# Patient Record
Sex: FEMALE | Race: BLACK OR AFRICAN AMERICAN | NOT HISPANIC OR LATINO | Employment: PART TIME | URBAN - METROPOLITAN AREA
[De-identification: names, ages, dates, MRNs, and addresses within clinical notes are randomized per-mention and may not be internally consistent; named-entity substitution may affect disease eponyms.]

---

## 2023-03-20 LAB
ABO GROUP (TYPE) IN BLOOD: NORMAL
ANTIBODY SCREEN: NORMAL
ERYTHROCYTE DISTRIBUTION WIDTH (RATIO) BY AUTOMATED COUNT: 11.7 % (ref 11.5–14.5)
ERYTHROCYTE MEAN CORPUSCULAR HEMOGLOBIN CONCENTRATION (G/DL) BY AUTOMATED: 33 G/DL (ref 32–36)
ERYTHROCYTE MEAN CORPUSCULAR VOLUME (FL) BY AUTOMATED COUNT: 93 FL (ref 80–100)
ERYTHROCYTES (10*6/UL) IN BLOOD BY AUTOMATED COUNT: 3.8 X10E12/L (ref 4–5.2)
HEMATOCRIT (%) IN BLOOD BY AUTOMATED COUNT: 35.5 % (ref 36–46)
HEMOGLOBIN (G/DL) IN BLOOD: 11.7 G/DL (ref 12–16)
HEPATITIS B VIRUS SURFACE AG PRESENCE IN SERUM: NONREACTIVE
HEPATITIS C VIRUS AB PRESENCE IN SERUM: NONREACTIVE
HIV 1/ 2 AG/AB SCREEN: NONREACTIVE
LEUKOCYTES (10*3/UL) IN BLOOD BY AUTOMATED COUNT: 3.4 X10E9/L (ref 4.4–11.3)
NRBC (PER 100 WBCS) BY AUTOMATED COUNT: 0 /100 WBC (ref 0–0)
PLATELETS (10*3/UL) IN BLOOD AUTOMATED COUNT: 157 X10E9/L (ref 150–450)
REFLEX ADDED, ANEMIA PANEL: ABNORMAL
RH FACTOR: NORMAL
RUBELLA VIRUS IGG AB: POSITIVE
SYPHILIS TOTAL AB: NONREACTIVE
VARICELLA ZOSTER IGG: POSITIVE

## 2023-03-21 LAB
CHLAMYDIA TRACH., AMPLIFIED: NEGATIVE
N. GONORRHEA, AMPLIFIED: NEGATIVE
URINE CULTURE: NORMAL

## 2023-03-23 LAB
HEMOGLOBIN A2: 2.9 %
HEMOGLOBIN A: 96.7 %
HEMOGLOBIN F: 0.4 %
HEMOGLOBIN IDENTIFICATION INTERPRETATION: NORMAL
PATH REVIEW-HGB IDENTIFICATION: NORMAL

## 2023-08-01 ENCOUNTER — OFFICE VISIT (OUTPATIENT)
Dept: PRIMARY CARE | Facility: CLINIC | Age: 23
End: 2023-08-01
Payer: MEDICAID

## 2023-08-01 ENCOUNTER — LAB (OUTPATIENT)
Dept: LAB | Facility: LAB | Age: 23
End: 2023-08-01
Payer: MEDICAID

## 2023-08-01 VITALS
SYSTOLIC BLOOD PRESSURE: 110 MMHG | HEART RATE: 83 BPM | BODY MASS INDEX: 17.66 KG/M2 | WEIGHT: 96 LBS | HEIGHT: 62 IN | OXYGEN SATURATION: 98 % | DIASTOLIC BLOOD PRESSURE: 80 MMHG

## 2023-08-01 DIAGNOSIS — R62.7 POOR WEIGHT GAIN IN ADULT: Primary | ICD-10-CM

## 2023-08-01 DIAGNOSIS — R62.7 POOR WEIGHT GAIN IN ADULT: ICD-10-CM

## 2023-08-01 DIAGNOSIS — N30.01 ACUTE CYSTITIS WITH HEMATURIA: ICD-10-CM

## 2023-08-01 DIAGNOSIS — R35.0 URINARY FREQUENCY: ICD-10-CM

## 2023-08-01 DIAGNOSIS — R11.0 NAUSEA: ICD-10-CM

## 2023-08-01 LAB
ANION GAP IN SER/PLAS: 14 MMOL/L (ref 10–20)
APPEARANCE, URINE: ABNORMAL
BACTERIA, URINE: ABNORMAL /HPF
BILIRUBIN, URINE: NEGATIVE
BLOOD, URINE: ABNORMAL
CALCIUM (MG/DL) IN SER/PLAS: 9.5 MG/DL (ref 8.6–10.6)
CARBON DIOXIDE, TOTAL (MMOL/L) IN SER/PLAS: 24 MMOL/L (ref 21–32)
CHLORIDE (MMOL/L) IN SER/PLAS: 102 MMOL/L (ref 98–107)
COLOR, URINE: ABNORMAL
CREATININE (MG/DL) IN SER/PLAS: 0.87 MG/DL (ref 0.5–1.05)
ERYTHROCYTE DISTRIBUTION WIDTH (RATIO) BY AUTOMATED COUNT: 11.5 % (ref 11.5–14.5)
ERYTHROCYTE MEAN CORPUSCULAR HEMOGLOBIN CONCENTRATION (G/DL) BY AUTOMATED: 32.7 G/DL (ref 32–36)
ERYTHROCYTE MEAN CORPUSCULAR VOLUME (FL) BY AUTOMATED COUNT: 93 FL (ref 80–100)
ERYTHROCYTES (10*6/UL) IN BLOOD BY AUTOMATED COUNT: 3.9 X10E12/L (ref 4–5.2)
GFR FEMALE: >90 ML/MIN/1.73M2
GLUCOSE (MG/DL) IN SER/PLAS: 83 MG/DL (ref 74–99)
GLUCOSE, URINE: NEGATIVE MG/DL
HEMATOCRIT (%) IN BLOOD BY AUTOMATED COUNT: 36.4 % (ref 36–46)
HEMOGLOBIN (G/DL) IN BLOOD: 11.9 G/DL (ref 12–16)
KETONES, URINE: ABNORMAL MG/DL
LEUKOCYTE ESTERASE, URINE: ABNORMAL
LEUKOCYTES (10*3/UL) IN BLOOD BY AUTOMATED COUNT: 6.2 X10E9/L (ref 4.4–11.3)
MUCUS, URINE: ABNORMAL /LPF
NITRITE, URINE: POSITIVE
NRBC (PER 100 WBCS) BY AUTOMATED COUNT: 0 /100 WBC (ref 0–0)
PH, URINE: 6 (ref 5–8)
PLATELETS (10*3/UL) IN BLOOD AUTOMATED COUNT: 164 X10E9/L (ref 150–450)
POTASSIUM (MMOL/L) IN SER/PLAS: 3.7 MMOL/L (ref 3.5–5.3)
PROTEIN, URINE: ABNORMAL MG/DL
RBC, URINE: 51 /HPF (ref 0–5)
SEDIMENTATION RATE, ERYTHROCYTE: 4 MM/H (ref 0–20)
SODIUM (MMOL/L) IN SER/PLAS: 136 MMOL/L (ref 136–145)
SPECIFIC GRAVITY, URINE: 1.02 (ref 1–1.03)
SQUAMOUS EPITHELIAL CELLS, URINE: <1 /HPF
THYROTROPIN (MIU/L) IN SER/PLAS BY DETECTION LIMIT <= 0.05 MIU/L: 0.93 MIU/L (ref 0.44–3.98)
UREA NITROGEN (MG/DL) IN SER/PLAS: 10 MG/DL (ref 6–23)
UROBILINOGEN, URINE: 4 MG/DL (ref 0–1.9)
WBC, URINE: >182 /HPF (ref 0–5)

## 2023-08-01 PROCEDURE — 1036F TOBACCO NON-USER: CPT | Performed by: STUDENT IN AN ORGANIZED HEALTH CARE EDUCATION/TRAINING PROGRAM

## 2023-08-01 PROCEDURE — 85652 RBC SED RATE AUTOMATED: CPT

## 2023-08-01 PROCEDURE — 87491 CHLMYD TRACH DNA AMP PROBE: CPT

## 2023-08-01 PROCEDURE — 99203 OFFICE O/P NEW LOW 30 MIN: CPT | Performed by: STUDENT IN AN ORGANIZED HEALTH CARE EDUCATION/TRAINING PROGRAM

## 2023-08-01 PROCEDURE — 81001 URINALYSIS AUTO W/SCOPE: CPT

## 2023-08-01 PROCEDURE — 87661 TRICHOMONAS VAGINALIS AMPLIF: CPT

## 2023-08-01 PROCEDURE — 80048 BASIC METABOLIC PNL TOTAL CA: CPT

## 2023-08-01 PROCEDURE — 81025 URINE PREGNANCY TEST: CPT

## 2023-08-01 PROCEDURE — 36415 COLL VENOUS BLD VENIPUNCTURE: CPT

## 2023-08-01 PROCEDURE — 84443 ASSAY THYROID STIM HORMONE: CPT

## 2023-08-01 PROCEDURE — 85027 COMPLETE CBC AUTOMATED: CPT

## 2023-08-01 PROCEDURE — 87591 N.GONORRHOEAE DNA AMP PROB: CPT

## 2023-08-01 RX ORDER — AMOXICILLIN AND CLAVULANATE POTASSIUM 875; 125 MG/1; MG/1
875 TABLET, FILM COATED ORAL 2 TIMES DAILY
Qty: 10 TABLET | Refills: 0 | Status: CANCELLED | OUTPATIENT
Start: 2023-08-01 | End: 2023-08-06

## 2023-08-01 NOTE — PROGRESS NOTES
"Subjective   Patient ID: Misael Diaz is a 23 y.o. female who presents for Establish Care, UTI, and Weight Loss.  HPI  Concerns today:  #Headaches   -For a month   -Pounding, generalized  -Every few days, most recent one was yesterday  -Sleep helps  -No photophobia, phonophobia    #Worried she is pregnant   -Nausea off and on  -Very tired  -Breast and back pain  -Month ago had period for one day of spotting  -2 negative pregnancy tests 7/7   -Miscarried in March 2023  -Not on birth control  -One partner     #Concern for UTI  -Increased urgency, bad odor, no blood, no fevers or chills  -Also has increased amount of white creamy discharge     #Concern for weight loss?  -Trying to drink boost supplements  -2021 44.6 kg  -Today 43.5 kg     Chronic issues:  #Eczema    Social history:  -Lives at home with mom  -Does not drink, smoke    Family history:   -Mother has blood clot and HTN      No allergies   No medications    Objective   Visit Vitals  /80   Pulse 83   Ht 1.575 m (5' 2\")   Wt (!) 43.5 kg (96 lb)   SpO2 98%   BMI 17.56 kg/m²   Smoking Status Never   BSA 1.38 m²      Physical Exam  General: Well appearing, conversational, in no acute distress  HEENT: EOMI, PERRL, nares patent without congestion, MMM  CV: RRR, no murmurs  Resp: Lungs CTAB, normal work of breathing  GI: Soft, nondistended, mild suprapubic tenderness to palpation, BS+  Ext: No lower ext swelling  Skin: Warm, dry, no rashes  Neuro: Awake, alert, oriented x3, moving all 4 extremities, nonfocal, normal gait, ambulates without assistance  Psych: Appropriate mood and affect      Assessment/Plan   Misael Diaz is a 23 y.o. female who presents for Establish Care, UTI, and Weight Loss. Concerned that patient may be pregnant based on symptoms and having only one day of spotting last month which may have been implantation bleeding. Will get UA and STI screening for urinary symptoms and a pregnancy test. Patient also having issues gaining " weight. Discussed sending a couple labs for this as well including TSH and inflammatory markers. Advised prenatal vitamin and tylenol for headache in the meantime.      Problem List Items Addressed This Visit    None  Visit Diagnoses       Poor weight gain in adult    -  Primary    Relevant Orders    CBC (Completed)    Basic Metabolic Panel (Completed)    TSH with reflex to Free T4 if abnormal (Completed)    Sedimentation Rate (Completed)    Urinary frequency        Relevant Orders    C. Trachomatis / N. Gonorrhoeae, Amplified Detection (Completed)    TRICH VAGINALIS, AMPLIFIED (Completed)    Urinalysis with Reflex Microscopic (Completed)    POCT Pregnancy, Urine manually resulted    Nausea        Relevant Orders    C. Trachomatis / N. Gonorrhoeae, Amplified Detection (Completed)    TRICH VAGINALIS, AMPLIFIED (Completed)    Urinalysis with Reflex Microscopic (Completed)    POCT Pregnancy, Urine manually resulted    CBC (Completed)    Basic Metabolic Panel (Completed)    TSH with reflex to Free T4 if abnormal (Completed)    Sedimentation Rate (Completed)    Acute cystitis with hematuria                     Kelsey Haro MD MPH

## 2023-08-01 NOTE — PATIENT INSTRUCTIONS
Thank you for coming today [facility].    Please go down to floor 1 and turn right to go to lab in [address]. If they are closed, go to floor [address] in [address].     I will be in touch with results!

## 2023-08-02 DIAGNOSIS — N30.01 ACUTE CYSTITIS WITH HEMATURIA: Primary | ICD-10-CM

## 2023-08-02 DIAGNOSIS — Z34.90 PREGNANCY, UNSPECIFIED GESTATIONAL AGE (HHS-HCC): ICD-10-CM

## 2023-08-02 LAB
CHLAMYDIA TRACH., AMPLIFIED: NEGATIVE
HCG, URINE: POSITIVE
N. GONORRHEA, AMPLIFIED: NEGATIVE
TRICHOMONAS VAGINALIS: NEGATIVE

## 2023-08-02 RX ORDER — AMOXICILLIN AND CLAVULANATE POTASSIUM 875; 125 MG/1; MG/1
875 TABLET, FILM COATED ORAL 2 TIMES DAILY
Qty: 10 TABLET | Refills: 0 | Status: SHIPPED | OUTPATIENT
Start: 2023-08-02 | End: 2023-08-07

## 2023-08-13 PROBLEM — L30.9 ECZEMA: Status: ACTIVE | Noted: 2023-08-13

## 2023-08-15 LAB — URINE CULTURE: NO GROWTH

## 2023-08-30 LAB
HEPATITIS B VIRUS SURFACE AG PRESENCE IN SERUM: NONREACTIVE
HEPATITIS C VIRUS AB PRESENCE IN SERUM: NONREACTIVE
HIV 1/ 2 AG/AB SCREEN: NONREACTIVE
RUBELLA VIRUS IGG AB: POSITIVE
SYPHILIS TOTAL AB: NONREACTIVE

## 2023-08-31 LAB
ABO GROUP (TYPE) IN BLOOD: NORMAL
ANTIBODY SCREEN: NORMAL
HEMOGLOBIN A2: 2.7 %
HEMOGLOBIN A: 96.8 %
HEMOGLOBIN F: 0.5 %
HEMOGLOBIN IDENTIFICATION INTERPRETATION: NORMAL
PATH REVIEW-HGB IDENTIFICATION: NORMAL
RH FACTOR: NORMAL

## 2023-10-02 ENCOUNTER — ANCILLARY PROCEDURE (OUTPATIENT)
Dept: RADIOLOGY | Facility: CLINIC | Age: 23
End: 2023-10-02
Payer: MEDICAID

## 2023-10-02 DIAGNOSIS — Z36.87 ENCOUNTER FOR ANTENATAL SCREENING FOR UNCERTAIN DATES (HHS-HCC): ICD-10-CM

## 2023-10-02 PROCEDURE — 76813 OB US NUCHAL MEAS 1 GEST: CPT | Performed by: OBSTETRICS & GYNECOLOGY

## 2023-10-02 PROCEDURE — 76813 OB US NUCHAL MEAS 1 GEST: CPT

## 2023-10-06 PROBLEM — R62.51 SLOW WEIGHT GAIN, CHILD: Status: ACTIVE | Noted: 2023-10-06

## 2023-10-06 PROBLEM — O21.9 NAUSEA AND VOMITING IN PREGNANCY (HHS-HCC): Status: ACTIVE | Noted: 2023-10-06

## 2023-10-06 PROBLEM — N94.6 DYSMENORRHEA: Status: ACTIVE | Noted: 2023-10-06

## 2023-10-06 PROBLEM — K13.0 CHEILOSIS: Status: ACTIVE | Noted: 2023-10-06

## 2023-10-06 PROBLEM — Z34.90 PREGNANCY (HHS-HCC): Status: ACTIVE | Noted: 2023-10-06

## 2023-10-06 RX ORDER — LYSINE HCL 500 MG
1 TABLET ORAL DAILY
Status: ON HOLD | COMMUNITY
Start: 2022-05-24 | End: 2023-11-03

## 2023-10-06 RX ORDER — TRIAMCINOLONE ACETONIDE 1 MG/G
CREAM TOPICAL
Status: ON HOLD | COMMUNITY
Start: 2022-10-26 | End: 2023-12-08 | Stop reason: ALTCHOICE

## 2023-10-06 RX ORDER — PYRIDOXINE HCL (VITAMIN B6) 25 MG
25 TABLET ORAL 3 TIMES DAILY
COMMUNITY
Start: 2023-09-21 | End: 2024-01-04

## 2023-10-06 RX ORDER — DOCUSATE SODIUM 100 MG/1
100 CAPSULE, LIQUID FILLED ORAL 2 TIMES DAILY PRN
COMMUNITY
Start: 2023-08-17 | End: 2024-01-04 | Stop reason: ALTCHOICE

## 2023-10-06 RX ORDER — FOLIC ACID/MULTIVIT,IRON,MINER 0.4MG-18MG
1 TABLET ORAL DAILY
COMMUNITY
Start: 2023-02-11 | End: 2023-12-08 | Stop reason: HOSPADM

## 2023-10-06 RX ORDER — METOCLOPRAMIDE 5 MG/1
5 TABLET ORAL EVERY 8 HOURS
Status: ON HOLD | COMMUNITY
Start: 2023-09-21 | End: 2023-11-03

## 2023-10-09 ENCOUNTER — ROUTINE PRENATAL (OUTPATIENT)
Dept: OBSTETRICS AND GYNECOLOGY | Facility: CLINIC | Age: 23
End: 2023-10-09
Payer: MEDICAID

## 2023-10-09 VITALS — BODY MASS INDEX: 16.64 KG/M2 | DIASTOLIC BLOOD PRESSURE: 60 MMHG | WEIGHT: 91 LBS | SYSTOLIC BLOOD PRESSURE: 100 MMHG

## 2023-10-09 DIAGNOSIS — Z3A.14 14 WEEKS GESTATION OF PREGNANCY (HHS-HCC): Primary | ICD-10-CM

## 2023-10-09 PROCEDURE — 99213 OFFICE O/P EST LOW 20 MIN: CPT | Performed by: OBSTETRICS & GYNECOLOGY

## 2023-10-09 NOTE — PROGRESS NOTES
Pt feeling well. Having some constipation. Not taking PNV.   Recommend PNV daily. New Rx needed.   Start colace daily. Miralax PRN.  New OB labs wnl.   NT wnl.   Anatomy US scheduled.   All question answered.

## 2023-11-01 ENCOUNTER — ROUTINE PRENATAL (OUTPATIENT)
Dept: OBSTETRICS AND GYNECOLOGY | Facility: CLINIC | Age: 23
End: 2023-11-01
Payer: MEDICAID

## 2023-11-01 VITALS — DIASTOLIC BLOOD PRESSURE: 56 MMHG | BODY MASS INDEX: 17.38 KG/M2 | WEIGHT: 95 LBS | SYSTOLIC BLOOD PRESSURE: 94 MMHG

## 2023-11-01 DIAGNOSIS — Z33.1 NORMAL PREGNANCY, INCIDENTAL (HHS-HCC): ICD-10-CM

## 2023-11-01 DIAGNOSIS — Z3A.17 17 WEEKS GESTATION OF PREGNANCY (HHS-HCC): Primary | ICD-10-CM

## 2023-11-01 PROBLEM — R62.51 SLOW WEIGHT GAIN, CHILD: Status: RESOLVED | Noted: 2023-10-06 | Resolved: 2023-11-01

## 2023-11-01 PROBLEM — K13.0 CHEILOSIS: Status: RESOLVED | Noted: 2023-10-06 | Resolved: 2023-11-01

## 2023-11-01 PROBLEM — N94.6 DYSMENORRHEA: Status: RESOLVED | Noted: 2023-10-06 | Resolved: 2023-11-01

## 2023-11-01 PROBLEM — Z34.90 PREGNANCY (HHS-HCC): Status: RESOLVED | Noted: 2023-10-06 | Resolved: 2023-11-01

## 2023-11-01 PROCEDURE — 99213 OFFICE O/P EST LOW 20 MIN: CPT | Performed by: OBSTETRICS & GYNECOLOGY

## 2023-11-01 NOTE — PROGRESS NOTES
Pt reports she never got Rx for PNV. Will resend today.   Continues to have some nausea but eating at least 2 meals a day and lots of snacks.   Anatomy US scheduled.   Declines flu vaccine.

## 2023-11-03 ENCOUNTER — HOSPITAL ENCOUNTER (OUTPATIENT)
Facility: HOSPITAL | Age: 23
Discharge: HOME | End: 2023-11-03
Attending: STUDENT IN AN ORGANIZED HEALTH CARE EDUCATION/TRAINING PROGRAM | Admitting: STUDENT IN AN ORGANIZED HEALTH CARE EDUCATION/TRAINING PROGRAM
Payer: MEDICAID

## 2023-11-03 VITALS
RESPIRATION RATE: 16 BRPM | SYSTOLIC BLOOD PRESSURE: 106 MMHG | WEIGHT: 94.58 LBS | HEIGHT: 62 IN | BODY MASS INDEX: 17.4 KG/M2 | TEMPERATURE: 98.1 F | HEART RATE: 95 BPM | OXYGEN SATURATION: 100 % | DIASTOLIC BLOOD PRESSURE: 56 MMHG

## 2023-11-03 PROCEDURE — 2500000001 HC RX 250 WO HCPCS SELF ADMINISTERED DRUGS (ALT 637 FOR MEDICARE OP)

## 2023-11-03 PROCEDURE — 94760 N-INVAS EAR/PLS OXIMETRY 1: CPT

## 2023-11-03 RX ORDER — METOCLOPRAMIDE 10 MG/1
10 TABLET ORAL ONCE
Status: COMPLETED | OUTPATIENT
Start: 2023-11-03 | End: 2023-11-03

## 2023-11-03 RX ORDER — ACETAMINOPHEN 325 MG/1
975 TABLET ORAL ONCE
Status: COMPLETED | OUTPATIENT
Start: 2023-11-03 | End: 2023-11-03

## 2023-11-03 RX ADMIN — ACETAMINOPHEN 975 MG: 325 TABLET ORAL at 22:07

## 2023-11-03 RX ADMIN — METOCLOPRAMIDE 10 MG: 10 TABLET ORAL at 22:06

## 2023-11-03 SDOH — HEALTH STABILITY: MENTAL HEALTH: WERE YOU ABLE TO COMPLETE ALL THE BEHAVIORAL HEALTH SCREENINGS?: YES

## 2023-11-03 SDOH — HEALTH STABILITY: MENTAL HEALTH: NON-SPECIFIC ACTIVE SUICIDAL THOUGHTS (PAST 1 MONTH): NO

## 2023-11-03 SDOH — HEALTH STABILITY: MENTAL HEALTH: HAVE YOU USED ANY SUBSTANCES (CANABIS, COCAINE, HEROIN, HALLUCINOGENS, INHALANTS, ETC.) IN THE PAST 12 MONTHS?: NO

## 2023-11-03 SDOH — SOCIAL STABILITY: SOCIAL INSECURITY: DOES ANYONE TRY TO KEEP YOU FROM HAVING/CONTACTING OTHER FRIENDS OR DOING THINGS OUTSIDE YOUR HOME?: NO

## 2023-11-03 SDOH — SOCIAL STABILITY: SOCIAL INSECURITY: ARE YOU OR HAVE YOU BEEN THREATENED OR ABUSED PHYSICALLY, EMOTIONALLY, OR SEXUALLY BY ANYONE?: NO

## 2023-11-03 SDOH — SOCIAL STABILITY: SOCIAL INSECURITY: PHYSICAL ABUSE: DENIES

## 2023-11-03 SDOH — SOCIAL STABILITY: SOCIAL INSECURITY: ABUSE SCREEN: ADULT

## 2023-11-03 SDOH — ECONOMIC STABILITY: HOUSING INSECURITY: DO YOU FEEL UNSAFE GOING BACK TO THE PLACE WHERE YOU ARE LIVING?: NO

## 2023-11-03 SDOH — SOCIAL STABILITY: SOCIAL INSECURITY: HAS ANYONE EVER THREATENED TO HURT YOUR FAMILY OR YOUR PETS?: NO

## 2023-11-03 SDOH — SOCIAL STABILITY: SOCIAL INSECURITY: ARE THERE ANY APPARENT SIGNS OF INJURIES/BEHAVIORS THAT COULD BE RELATED TO ABUSE/NEGLECT?: NO

## 2023-11-03 SDOH — HEALTH STABILITY: MENTAL HEALTH: WISH TO BE DEAD (PAST 1 MONTH): NO

## 2023-11-03 SDOH — HEALTH STABILITY: MENTAL HEALTH: SUICIDAL BEHAVIOR (LIFETIME): NO

## 2023-11-03 SDOH — SOCIAL STABILITY: SOCIAL INSECURITY: HAVE YOU HAD THOUGHTS OF HARMING ANYONE ELSE?: NO

## 2023-11-03 SDOH — SOCIAL STABILITY: SOCIAL INSECURITY: VERBAL ABUSE: DENIES

## 2023-11-03 SDOH — SOCIAL STABILITY: SOCIAL INSECURITY: DO YOU FEEL ANYONE HAS EXPLOITED OR TAKEN ADVANTAGE OF YOU FINANCIALLY OR OF YOUR PERSONAL PROPERTY?: NO

## 2023-11-03 ASSESSMENT — PAIN SCALES - GENERAL
PAINLEVEL_OUTOF10: 2
PAINLEVEL_OUTOF10: 6
PAINLEVEL_OUTOF10: 8
PAINLEVEL_OUTOF10: 4

## 2023-11-03 ASSESSMENT — LIFESTYLE VARIABLES
HOW OFTEN DO YOU HAVE 6 OR MORE DRINKS ON ONE OCCASION: NEVER
SKIP TO QUESTIONS 9-10: 1
AUDIT-C TOTAL SCORE: 0
AUDIT-C TOTAL SCORE: 0
HOW OFTEN DO YOU HAVE A DRINK CONTAINING ALCOHOL: NEVER
HOW MANY STANDARD DRINKS CONTAINING ALCOHOL DO YOU HAVE ON A TYPICAL DAY: PATIENT DOES NOT DRINK

## 2023-11-03 ASSESSMENT — PATIENT HEALTH QUESTIONNAIRE - PHQ9: 1. LITTLE INTEREST OR PLEASURE IN DOING THINGS: NOT AT ALL

## 2023-11-04 ENCOUNTER — HOSPITAL ENCOUNTER (OUTPATIENT)
Facility: HOSPITAL | Age: 23
Discharge: HOME | End: 2023-11-04
Payer: MEDICAID

## 2023-11-04 PROCEDURE — 99213 OFFICE O/P EST LOW 20 MIN: CPT | Mod: GC

## 2023-11-04 PROCEDURE — 4500999001 HC ED NO CHARGE

## 2023-11-04 NOTE — H&P
"Obstetrical Triage History and Physical    Assessment   22yo  at 17.3wga presenting for headaches and abdominal pain.     Headache  - improved with Tylenol and Reglan  - no h/o hypertensive disease, normotensive today    Abdominal pain  - VSS, abd exam benign  - likely round ligament pain  - given Tylenol and heat packs    Discharged home. Return precautions given.    Seen and discussed w/ Dr. Gerda Dale MD PGY-1  Obstetrics & Gynecology      Subjective   History of Present Pregnancy  Misael Diaz is a 23 y.o.  gravid, female. Patient's last menstrual period was 2023. with an Estimated Date of Delivery of 2024, by Last Menstrual Period, who is now 17w4d gestation. The patient's blood type is A POS.     Patient reporting headaches since the beginning of the pregnancy. Has not taken any medication for relief. States they resolve upon sleeping. Last night was unable to sleep d/t headache as well as abdominal pain. Reports pain radiating from the fundus towards her lower pelvis. Worsens with position changes. Does endorse nausea. Denies any fevers, chills, diarrhea, constipation, or urinary symptoms.     Objective   Recent Vital Signs:                                 /56   Pulse 95   Temp 36.7 °C (98.1 °F) (Temporal)   Resp 16   Ht 1.575 m (5' 2\")   Wt (!) 42.9 kg (94 lb 9.2 oz)   BMI 17.30 kg/m²     BP & Temp Min/Max Last 24 Hours:     BP  Min: 103/62   Min taken time: 23  Max: 106/56   Max taken time: 23 2342  Temp  Av.7 °C (98.1 °F)  Min: 36.7 °C (98.1 °F)   Min taken time: 23  Max: 36.7 °C (98.1 °F)   Max taken time: 23    Physical Examination:  GENERAL: Examination reveals a well developed, well nourished, gravid female in no acute distress. She is alert and cooperative.  HEENT: External ears normal. Nose normal, no erythema or discharge. Mouth and throat clear.  NECK: supple, no significant adenopathy  LUNGS: Normal " respiratory effort  HEART: RRR  ABDOMEN: Gravid, mild tenderness to palpation of the uterus, no rebound or guarding  VAGINA: normal appearing vagina with normal color and discharge and no lesions noted, cervix is closed, long, and high  EXTREMITIES: no limitation in range of motion  SKIN: normal coloration and turgor, no rashes  NEUROLOGICAL: alert, oriented, normal speech, no focal findings or movement disorder noted  PSYCHOLOGICAL: awake and alert; oriented to person, place, and time      I saw and evaluated the patient. I personally obtained the key and critical portions of the history and physical exam or was physically present for key and critical portions performed by the resident/fellow. I reviewed the resident/fellow's documentation and discussed the patient with the resident/fellow. I agree with the resident/fellow's medical decision making as documented in the note.    Eleanor Lorenz MD

## 2023-11-20 ENCOUNTER — ANCILLARY PROCEDURE (OUTPATIENT)
Dept: RADIOLOGY | Facility: CLINIC | Age: 23
End: 2023-11-20
Payer: MEDICAID

## 2023-11-20 DIAGNOSIS — Z3A.19 19 WEEKS GESTATION OF PREGNANCY (HHS-HCC): Primary | ICD-10-CM

## 2023-11-20 DIAGNOSIS — Z36.3 ENCOUNTER FOR ANTENATAL SCREENING FOR MALFORMATIONS (HHS-HCC): ICD-10-CM

## 2023-11-20 PROCEDURE — 76811 OB US DETAILED SNGL FETUS: CPT | Performed by: OBSTETRICS & GYNECOLOGY

## 2023-11-20 PROCEDURE — 76811 OB US DETAILED SNGL FETUS: CPT

## 2023-12-05 ENCOUNTER — INITIAL PRENATAL (OUTPATIENT)
Dept: OBSTETRICS AND GYNECOLOGY | Facility: CLINIC | Age: 23
End: 2023-12-05
Payer: MEDICAID

## 2023-12-05 VITALS — DIASTOLIC BLOOD PRESSURE: 59 MMHG | BODY MASS INDEX: 18.29 KG/M2 | WEIGHT: 100 LBS | SYSTOLIC BLOOD PRESSURE: 99 MMHG

## 2023-12-05 DIAGNOSIS — Z3A.22 22 WEEKS GESTATION OF PREGNANCY (HHS-HCC): ICD-10-CM

## 2023-12-05 DIAGNOSIS — O26.892 PREGNANCY HEADACHE IN SECOND TRIMESTER (HHS-HCC): Primary | ICD-10-CM

## 2023-12-05 DIAGNOSIS — R51.9 PREGNANCY HEADACHE IN SECOND TRIMESTER (HHS-HCC): Primary | ICD-10-CM

## 2023-12-05 PROCEDURE — 99213 OFFICE O/P EST LOW 20 MIN: CPT | Performed by: OBSTETRICS & GYNECOLOGY

## 2023-12-05 RX ORDER — METOCLOPRAMIDE 10 MG/1
10 TABLET ORAL 4 TIMES DAILY PRN
Qty: 10 TABLET | Refills: 3 | Status: SHIPPED | OUTPATIENT
Start: 2023-12-05 | End: 2024-01-04 | Stop reason: ALTCHOICE

## 2023-12-05 RX ORDER — POLYETHYLENE GLYCOL 3350 17 G/17G
17 POWDER, FOR SOLUTION ORAL DAILY
COMMUNITY
End: 2024-01-04

## 2023-12-05 RX ORDER — DIPHENHYDRAMINE HCL 25 MG
50 CAPSULE ORAL EVERY 8 HOURS PRN
Qty: 30 CAPSULE | Refills: 2 | Status: SHIPPED | OUTPATIENT
Start: 2023-12-05 | End: 2024-03-06 | Stop reason: HOSPADM

## 2023-12-05 NOTE — PROGRESS NOTES
Pt reports she is not doing well. C/o frequent headaches and lots of stress.  Pt reports stress mostly surrounds her family and living situation. She is currently living with her mom who lost her job.  Patient is unsure how much longer her mom will be able to keep her home.  Pt states she is able to financially support herself and has looked for new housing but has not moved yet.  Wondering about support services to help with this.  Pt also reports new onset headaches.  Happening every few days.  Currently has headache that is 5 out of 10 on pain scale.  Has tried tylenol but reports it usually doesn't help. When pain gets to 7 out of 10 patient states she has to lie down and sleep and then headache will resolve. Denies history of headaches.  She reports she is drinking plenty of water.    Pt reports she sees a therapist who has been trying to help her with stress.     Anatomy US wnl.     Headaches  Discussed when to seek emergency care. Sent rx for benadryl and reglan to see if this would help with headaches. Recommend plenty of oral hydration.      Stress  Will help patient with resources as much as possible.   Provided positive support for patient - she has already taken steps to remove herself from stressors but feels she needs more support.   Discussed medication for anxiety/depression but patient declines. States she would never consider taking medication during her pregnancy even if I tell her it is safe in pregnancy.

## 2023-12-08 ENCOUNTER — HOSPITAL ENCOUNTER (OUTPATIENT)
Facility: HOSPITAL | Age: 23
Discharge: HOME | End: 2023-12-08
Attending: OBSTETRICS & GYNECOLOGY | Admitting: OBSTETRICS & GYNECOLOGY
Payer: MEDICAID

## 2023-12-08 ENCOUNTER — HOSPITAL ENCOUNTER (OUTPATIENT)
Facility: HOSPITAL | Age: 23
End: 2023-12-08
Attending: OBSTETRICS & GYNECOLOGY | Admitting: OBSTETRICS & GYNECOLOGY
Payer: MEDICAID

## 2023-12-08 VITALS
HEART RATE: 86 BPM | WEIGHT: 99.65 LBS | RESPIRATION RATE: 16 BRPM | TEMPERATURE: 97.9 F | SYSTOLIC BLOOD PRESSURE: 97 MMHG | BODY MASS INDEX: 18.34 KG/M2 | OXYGEN SATURATION: 97 % | HEIGHT: 62 IN | DIASTOLIC BLOOD PRESSURE: 54 MMHG

## 2023-12-08 DIAGNOSIS — N94.9 ROUND LIGAMENT PAIN: Primary | ICD-10-CM

## 2023-12-08 LAB
POC APPEARANCE, URINE: CLEAR
POC BILIRUBIN, URINE: NEGATIVE
POC BLOOD, URINE: NEGATIVE
POC COLOR, URINE: YELLOW
POC GLUCOSE, URINE: NEGATIVE MG/DL
POC KETONES, URINE: NEGATIVE MG/DL
POC LEUKOCYTES, URINE: NEGATIVE
POC NITRITE,URINE: NEGATIVE
POC PH, URINE: 6.5 PH
POC PROTEIN, URINE: NEGATIVE MG/DL
POC SPECIFIC GRAVITY, URINE: 1.02
POC UROBILINOGEN, URINE: 2 EU/DL

## 2023-12-08 PROCEDURE — 94760 N-INVAS EAR/PLS OXIMETRY 1: CPT

## 2023-12-08 PROCEDURE — 99214 OFFICE O/P EST MOD 30 MIN: CPT | Mod: 25

## 2023-12-08 PROCEDURE — 2500000001 HC RX 250 WO HCPCS SELF ADMINISTERED DRUGS (ALT 637 FOR MEDICARE OP)

## 2023-12-08 PROCEDURE — 99213 OFFICE O/P EST LOW 20 MIN: CPT

## 2023-12-08 PROCEDURE — 4500999001 HC ED NO CHARGE

## 2023-12-08 RX ORDER — CYCLOBENZAPRINE HCL 10 MG
10 TABLET ORAL ONCE
Status: COMPLETED | OUTPATIENT
Start: 2023-12-08 | End: 2023-12-08

## 2023-12-08 RX ORDER — ACETAMINOPHEN 325 MG/1
975 TABLET ORAL ONCE
Status: COMPLETED | OUTPATIENT
Start: 2023-12-08 | End: 2023-12-08

## 2023-12-08 RX ADMIN — ACETAMINOPHEN 975 MG: 325 TABLET ORAL at 11:04

## 2023-12-08 RX ADMIN — CYCLOBENZAPRINE 10 MG: 10 TABLET, FILM COATED ORAL at 11:04

## 2023-12-08 SDOH — HEALTH STABILITY: MENTAL HEALTH: WERE YOU ABLE TO COMPLETE ALL THE BEHAVIORAL HEALTH SCREENINGS?: YES

## 2023-12-08 SDOH — HEALTH STABILITY: MENTAL HEALTH: HAVE YOU USED ANY SUBSTANCES (CANABIS, COCAINE, HEROIN, HALLUCINOGENS, INHALANTS, ETC.) IN THE PAST 12 MONTHS?: NO

## 2023-12-08 SDOH — SOCIAL STABILITY: SOCIAL INSECURITY: ABUSE SCREEN: ADULT

## 2023-12-08 SDOH — HEALTH STABILITY: MENTAL HEALTH: SUICIDAL BEHAVIOR (LIFETIME): NO

## 2023-12-08 SDOH — SOCIAL STABILITY: SOCIAL INSECURITY: PHYSICAL ABUSE: DENIES

## 2023-12-08 SDOH — SOCIAL STABILITY: SOCIAL INSECURITY: ARE THERE ANY APPARENT SIGNS OF INJURIES/BEHAVIORS THAT COULD BE RELATED TO ABUSE/NEGLECT?: NO

## 2023-12-08 SDOH — SOCIAL STABILITY: SOCIAL INSECURITY: DOES ANYONE TRY TO KEEP YOU FROM HAVING/CONTACTING OTHER FRIENDS OR DOING THINGS OUTSIDE YOUR HOME?: NO

## 2023-12-08 SDOH — SOCIAL STABILITY: SOCIAL INSECURITY: ARE YOU OR HAVE YOU BEEN THREATENED OR ABUSED PHYSICALLY, EMOTIONALLY, OR SEXUALLY BY ANYONE?: NO

## 2023-12-08 SDOH — SOCIAL STABILITY: SOCIAL INSECURITY: DO YOU FEEL ANYONE HAS EXPLOITED OR TAKEN ADVANTAGE OF YOU FINANCIALLY OR OF YOUR PERSONAL PROPERTY?: NO

## 2023-12-08 SDOH — HEALTH STABILITY: MENTAL HEALTH: WISH TO BE DEAD (PAST 1 MONTH): NO

## 2023-12-08 SDOH — ECONOMIC STABILITY: HOUSING INSECURITY: DO YOU FEEL UNSAFE GOING BACK TO THE PLACE WHERE YOU ARE LIVING?: NO

## 2023-12-08 SDOH — HEALTH STABILITY: MENTAL HEALTH: STRENGTHS (MUST CHOOSE TWO): DEMONSTRATES EFFECTIVE COPING SKILLS;SUPPORT FROM FAMILY

## 2023-12-08 SDOH — SOCIAL STABILITY: SOCIAL INSECURITY: HAVE YOU HAD THOUGHTS OF HARMING ANYONE ELSE?: NO

## 2023-12-08 SDOH — HEALTH STABILITY: MENTAL HEALTH: NON-SPECIFIC ACTIVE SUICIDAL THOUGHTS (PAST 1 MONTH): NO

## 2023-12-08 SDOH — HEALTH STABILITY: MENTAL HEALTH: HAVE YOU USED ANY PRESCRIPTION DRUGS OTHER THAN PRESCRIBED IN THE PAST 12 MONTHS?: NO

## 2023-12-08 SDOH — SOCIAL STABILITY: SOCIAL INSECURITY: VERBAL ABUSE: DENIES

## 2023-12-08 SDOH — SOCIAL STABILITY: SOCIAL INSECURITY: HAS ANYONE EVER THREATENED TO HURT YOUR FAMILY OR YOUR PETS?: NO

## 2023-12-08 ASSESSMENT — ACTIVITIES OF DAILY LIVING (ADL): LACK_OF_TRANSPORTATION: NO

## 2023-12-08 ASSESSMENT — LIFESTYLE VARIABLES
HOW OFTEN DO YOU HAVE A DRINK CONTAINING ALCOHOL: NEVER
SKIP TO QUESTIONS 9-10: 1
AUDIT-C TOTAL SCORE: 0
AUDIT-C TOTAL SCORE: 0
HOW OFTEN DO YOU HAVE 6 OR MORE DRINKS ON ONE OCCASION: NEVER
HOW MANY STANDARD DRINKS CONTAINING ALCOHOL DO YOU HAVE ON A TYPICAL DAY: PATIENT DOES NOT DRINK

## 2023-12-08 ASSESSMENT — PAIN SCALES - GENERAL
PAINLEVEL_OUTOF10: 9
PAINLEVEL_OUTOF10: 7

## 2023-12-08 ASSESSMENT — PATIENT HEALTH QUESTIONNAIRE - PHQ9
1. LITTLE INTEREST OR PLEASURE IN DOING THINGS: NOT AT ALL
SUM OF ALL RESPONSES TO PHQ9 QUESTIONS 1 & 2: 0
2. FEELING DOWN, DEPRESSED OR HOPELESS: NOT AT ALL

## 2023-12-08 NOTE — H&P
Obstetrical Admission History and Physical     [facility] JAY Diaz is a 23 y.o. . 22w3d TRIAGE    Chief Complaint: right side pain (Stabbing pain on lateral right abdomen, 9/10, started at 0700, worsened with right leg movement)    Assessment/Plan    Abdominal Pain  - Right sided lower abdominal pain   - VSS, good appetite, no dysuria, no vaginal complaints, normal bowel movements   - Abdominal exam benign  - Cervix closed  - Tylenol, flexeril, heat packs with some improvement of pain   - Likely round ligament pain, encouraged pregnancy support band     IUP at 22w3d   -  bpm   - Good fetal movement  - Continue routine prenatal care, next appt:   - Precautions to return discussed     Maternal Well-being  - Vital signs stable and WNL  - Emotional support and reassurance provided  - All questions and concerns addressed     D/w Dr. Hicks.   Cat Nunez PA-C     Active Problems:  There are no active Hospital Problems.      Pregnancy Problems (from 22 to present)       Problem Noted Resolved    Nausea and vomiting in pregnancy 10/6/2023 by Zoë Huitron No    Priority:  Medium      Pregnancy 10/6/2023 by Zoë Huitron 2023 by Zenaida VACA MD          Subjective   [facility] is here complaining of abdominal pain. Reports started at 7AM. Was taking bowel movement and then felt pain increased on right side, felt debilitated- unable to get off toilet. She reports feeling well previously. Normal appetite. She reports taking stool softener and having normal bowel movements. She denies dysuria or UTI sx. She denies vaginal irritation, abnl discharge, c/f STIs. Good fetal movement. Denies vaginal bleeding., Denies contractions., Denies leaking of fluid.         Obstetrical History   OB History    Para Term  AB Living   2 0 0 0 1 0   SAB IAB Ectopic Multiple Live Births   1 0 0 0 0      # Outcome Date GA Lbr Jack/2nd Weight Sex Delivery Anes PTL Lv   2 Current            1  SAB 03/23/23 10w0d              Past Medical History  Past Medical History:   Diagnosis Date    Dermatitis, unspecified 04/26/2016    Eczema, unspecified type    Dysmenorrhea 10/06/2023    Encounter for immunization 04/26/2016    Immunization due    Encounter for immunization     Immunization due    Failure to thrive (child) 04/26/2016    Slow weight gain, child    Follicular cyst of the skin and subcutaneous tissue, unspecified 04/26/2016    Skin cyst    Other specified health status 08/26/2014    Known health problems: none    Other specified health status 04/26/2016    No pertinent past surgical history        Past Surgical History   Past Surgical History:   Procedure Laterality Date    CT AORTA AND BILATERAL ILIOFEMORAL RUNOFF ANGIOGRAM W AND/OR WO IV CONTRAST  4/26/2022    CT AORTA AND BILATERAL ILIOFEMORAL RUNOFF ANGIOGRAM W AND/OR WO IV CONTRAST 4/26/2022 [facility] [place] [facility]    OTHER SURGICAL HISTORY  08/08/2019    No history of surgery       Social History  Social History     Tobacco Use    Smoking status: Never    Smokeless tobacco: Never   Substance Use Topics    Alcohol use: Not Currently     Substance and Sexual Activity   Drug Use Never       Allergies  Patient has no known allergies.     Medications  Medications Prior to Admission   Medication Sig Dispense Refill Last Dose    diphenhydrAMINE (BenadryL) 25 mg capsule Take 2 capsules (50 mg) by mouth every 8 hours if needed for itching (headache in pregnancy) for up to 10 days. 30 capsule 2 Unknown    docusate sodium (Colace) 100 mg capsule Take 1 capsule (100 mg) by mouth 2 times a day as needed for constipation.   12/8/2023 at 0200    metoclopramide (Reglan) 10 mg tablet Take 1 tablet (10 mg) by mouth 4 times a day as needed (headache in pregnancy). 10 tablet 3 Unknown    ondansetron ODT (Zofran-ODT) 4 mg disintegrating tablet DISSOLVE 1 TABLET IN MOUTH EVERY 6 HOURS AS NEEDED FOR NAUSEA (Patient not taking: Reported on 12/8/2023) 15 tablet 3 More  than a month    ondansetron ODT (Zofran-ODT) 4 mg disintegrating tablet DISSOLVE 1 TABLET IN MOUTH BY MOUTH EVERY 6 HOURS AS NEEDED (Patient not taking: Reported on 12/8/2023) 15 tablet 1 More than a month    polyethylene glycol (Glycolax, Miralax) 17 gram packet Take 17 g by mouth once daily.   Past Week    prenatal no.167-folic acid-dha 400 mcg- 25 mg tablet,chewable Chew 1 tablet once daily. 90 tablet 3 Unknown    prenatal vitamin, iron-folic, (PNV cmb#95-ferrous fumarate-FA) 27 mg iron-800 mcg folic acid tablet Take 1 tablet by mouth once daily.   12/7/2023    prenatal vitamin, iron-folic, (prenatal vit no.130-iron-folic) 27 mg iron-800 mcg folic acid tablet Take 1 tablet by mouth once daily. 90 tablet 3 Unknown    Vitamin B-6 25 mg tablet Take 1 tablet (25 mg) by mouth 3 times a day.   Unknown       Objective    Last Vitals  Temp Pulse Resp BP MAP O2 Sat   36.6 °C (97.9 °F) 86 16 97/54   97 %     Physical Examination  GENERAL: Examination reveals a well developed, well nourished, gravid female in no acute distress. She is alert and cooperative.  ABDOMEN: soft, gravid, nontender, nondistended, no abnormal masses, no epigastric pain, fundus soft, nontender, size equal dates, FHT present, no CVAT, no guarding/no rigidity   FHR is 150 BPM on doppler   CERVIX: closed and thick     Lab Review  Labs in chart were reviewed.

## 2023-12-08 NOTE — LETTER
December 8, 2023     Patient: Misael Diaz   YOB: 2000   Date of Visit: 12/8/2023       To Whom It May Concern:    Misael Diaz was seen in my clinic on 12/8/2023 at ?. Please excuse Misael for her absence from work on this day to make the appointment.    If you have any questions or concerns, please don't hesitate to call.         Sincerely,       Cat Nunez PA-C

## 2023-12-14 DIAGNOSIS — Z33.1 NORMAL PREGNANCY, INCIDENTAL (HHS-HCC): Primary | ICD-10-CM

## 2023-12-14 NOTE — TELEPHONE ENCOUNTER
Pt is 23w2d.  Requesting rx RF of PNV.  Pt's next OB visit scheduled 1/8/2024 with Dr. Bradshaw.   Order pended if approved.     Requested Prescriptions     Pending Prescriptions Disp Refills    prenatal vit,cyndi 74-iron-folic 27 mg iron- 1 mg tablet 30 each 11     Sig: Take 1 tablet by mouth once daily.

## 2023-12-14 NOTE — TELEPHONE ENCOUNTER
Patient is waiting for a new prescription for her prenatal vitamins patient prefers the pill form please call in prescription for or escript.

## 2023-12-19 ENCOUNTER — TELEPHONE (OUTPATIENT)
Dept: OBSTETRICS AND GYNECOLOGY | Facility: HOSPITAL | Age: 23
End: 2023-12-19
Payer: MEDICAID

## 2023-12-19 NOTE — TELEPHONE ENCOUNTER
Complex/High Risk OB Program     At the request of Dr. Bradshaw and [facility], called patient for introduction.   Role, availability, and contact phone number reviewed ([phone]).   Confirmed she is still interested resources (assistance with moving, baby supplies, etc).   Informed her that the [facility] ([place], [phone]) will give her call.   Patient confirmed afternoon phone calls work best for her.   Available for future coordination needs, if needed.       Nathalie Smith CNP  Complex/High Risk OB   [phone]

## 2024-01-04 ENCOUNTER — ROUTINE PRENATAL (OUTPATIENT)
Dept: OBSTETRICS AND GYNECOLOGY | Facility: CLINIC | Age: 24
End: 2024-01-04
Payer: MEDICAID

## 2024-01-04 VITALS — BODY MASS INDEX: 18.66 KG/M2 | DIASTOLIC BLOOD PRESSURE: 60 MMHG | SYSTOLIC BLOOD PRESSURE: 102 MMHG | WEIGHT: 102 LBS

## 2024-01-04 DIAGNOSIS — Z34.02 ENCOUNTER FOR SUPERVISION OF NORMAL FIRST PREGNANCY IN SECOND TRIMESTER (HHS-HCC): ICD-10-CM

## 2024-01-04 DIAGNOSIS — Z3A.26 26 WEEKS GESTATION OF PREGNANCY (HHS-HCC): Primary | ICD-10-CM

## 2024-01-04 PROCEDURE — 82746 ASSAY OF FOLIC ACID SERUM: CPT

## 2024-01-04 PROCEDURE — 82947 ASSAY GLUCOSE BLOOD QUANT: CPT

## 2024-01-04 PROCEDURE — 85027 COMPLETE CBC AUTOMATED: CPT

## 2024-01-04 PROCEDURE — 86780 TREPONEMA PALLIDUM: CPT

## 2024-01-04 PROCEDURE — 36415 COLL VENOUS BLD VENIPUNCTURE: CPT

## 2024-01-04 PROCEDURE — 82728 ASSAY OF FERRITIN: CPT

## 2024-01-04 PROCEDURE — 83550 IRON BINDING TEST: CPT

## 2024-01-04 PROCEDURE — 99214 OFFICE O/P EST MOD 30 MIN: CPT | Performed by: ADVANCED PRACTICE MIDWIFE

## 2024-01-04 PROCEDURE — 82607 VITAMIN B-12: CPT

## 2024-01-04 NOTE — PROGRESS NOTES
Assessment/Plan   Problem List Items Addressed This Visit    None  Visit Diagnoses         Codes    26 weeks gestation of pregnancy    -  Primary Z3A.26    Relevant Orders    Glucose, 1 Hour Screen, Pregnancy    CBC Anemia Panel With Reflex,Pregnancy    Syphilis Screen with Reflex    Encounter for supervision of normal first pregnancy in second trimester     Z34.02            Discussed diabetes screening and routine labs, to be completed today    Reviewed s/sx of PTL, warning signs, fetal movement counts, and when to call provider  Follow up in 4 weeks for a routine prenatal visit.    MANJEET Huerta    Subjective     T[place] JAY Diaz is a 23 y.o.  at 26w2d with a working estimated date of delivery of 2024, by Last Menstrual Period who presents for a routine prenatal visit.  She is a transfer to the [facility] service from Dr. Brasdhaw.    Vaginal bleeding no  Leakage of fluid  no  Decreased fetal movements no  Contractions no    Her pregnancy is complicated by:  Pregnancy Problems (from 22 to present)       Problem Noted Resolved    Nausea and vomiting in pregnancy 10/6/2023 by Zoë Huitron No    Pregnancy 10/6/2023 by Zoë Huitron 2023 by Zenaida VACA MD             Objective   Physical Exam  Weight: 46.3 kg (102 lb)  TWG: 3.629 kg (8 lb)  Expected Total Weight Gain: 12.5 kg (27 lb)-18 kg (39 lb)   Pregravid BMI: 17.19  BP: 102/60         Postpartum Depression: Not on file       Prenatal Labs  Lab Results   Component Value Date    HGB 11.4 (L) 2023    HCT 31.8 (L) 2023     2023    ABO A 2023    LABRH POS 2023    NEISSGONOAMP NEGATIVE 2023    CHLAMTRACAMP NEGATIVE 2023    SYPHT NONREACTIVE 2023    HEPBSAG NONREACTIVE 2023    HIV1X2 NONREACTIVE 2023    URINECULTURE NO GROWTH 2023             MANJEET Huerta        OB History    Para Term  AB Living   2 0 0 0 1 0   SAB IAB Ectopic  Multiple Live Births   1 0 0 0 0      # Outcome Date GA Lbr Jack/2nd Weight Sex Delivery Anes PTL Lv   2 Current            1 SAB 03/23/23 10w0d                 Past Medical History:   Diagnosis Date    Dermatitis, unspecified 04/26/2016    Eczema, unspecified type    Dysmenorrhea 10/06/2023    Encounter for immunization 04/26/2016    Immunization due    Encounter for immunization     Immunization due    Failure to thrive (child) 04/26/2016    Slow weight gain, child    Follicular cyst of the skin and subcutaneous tissue, unspecified 04/26/2016    Skin cyst    Other specified health status 08/26/2014    Known health problems: none    Other specified health status 04/26/2016    No pertinent past surgical history      Past Surgical History:   Procedure Laterality Date    CT AORTA AND BILATERAL ILIOFEMORAL RUNOFF ANGIOGRAM W AND/OR WO IV CONTRAST  4/26/2022    CT AORTA AND BILATERAL ILIOFEMORAL RUNOFF ANGIOGRAM W AND/OR WO IV CONTRAST 4/26/2022 [facility]    OTHER SURGICAL HISTORY  08/08/2019    No history of surgery      Social History     Socioeconomic History    Marital status: Single     Spouse name: Not on file    Number of children: Not on file    Years of education: Not on file    Highest education level: Some college, no degree   Occupational History     Employer: [employer]   Tobacco Use    Smoking status: Never    Smokeless tobacco: Never   Vaping Use    Vaping Use: Unknown   Substance and Sexual Activity    Alcohol use: Not Currently    Drug use: Never    Sexual activity: Yes   Other Topics Concern    Not on file   Social History Narrative    Not on file     Social Determinants of Health     Financial Resource Strain: Low Risk  (12/8/2023)    Overall Financial Resource Strain (CARDIA)     Difficulty of Paying Living Expenses: Not very hard   Food Insecurity: Not on file   Transportation Needs: No Transportation Needs (12/8/2023)    PRAPARE - Transportation     Lack of Transportation  (Medical): No     Lack of Transportation (Non-Medical): No   Physical Activity: Not on file   Stress: Not on file   Social Connections: Not on file   Intimate Partner Violence: Not on file        Objective   Physical Exam  Weight: 46.3 kg (102 lb)  TWG: 3.629 kg (8 lb)   Expected Total Weight Gain: 12.5 kg (27 lb)-18 kg (39 lb)   Pregravid BMI: 17.19  BP: 102/60    Review of Systems     OBGyn Exam     Postpartum Depression: Not on file        Pregnancy Problems (from 08/14/23 to present)       Problem Noted Resolved    Nausea and vomiting in pregnancy 10/6/2023 by Zoë Huitron No    Pregnancy 10/6/2023 by Zoë Huitron 11/1/2023 by Zenaida VACA MD             Education provided:   Avoidance of alcohol, tobacco and drug use   Dietary restrictions reviewed including avoiding raw or poorly cooked meat, lunch meat and soft cheeses  3.    Adequate water intake.  Avoid empty calories with juices  4.    Recommendation for weight gain based on initial BMI (body mass index)  5.    Limit caffeine to less than 200-300 mg/day  6.    Take folic acid 400 mcg daily.  Incorporate 5,000u Vitamin D3 per day.  Discuss Magnesium Supplementation  7.    Importance of good sleep hygiene and avoidance of laying on back after 15 weeks  8.    Encourage daily physical activity of 30 minutes a day the majority of the days of the week  9.    Discussed normal physiologic changes:  Round ligament pain, nausea, breast tenderness  10.  Discussed natural remedies, vitamins and prescription medications for nausea  11.  Baby aspirin 162mg daily (two baby aspirin) for the reduction of pre-eclampsia during pregnancy.  Even if you have          never had any blood pressure issues, you can develop hypertension during your pregnancy.  This has been well          Studied and safe to take starting at 12 weeks gestation until after the birth of your baby.    **IF AT ANYTIME DURING YOUR PREGNANCY YOU HAVE CONCERNS THAT YOU CANNOT AFFORD HEALTHY FOOD  PLEASE LET US KNOW!**  We have a Food for Life program and would be happy to place a referral for you.  It is so important to eat healthy during your pregnancy and we treat food as medicine.  Healthy food is expensive!  This program will allow you and your family up to 4 to receive food and recipes for one week per month.  This needs to be renewed every 6 months.    Ultrasound and screening for aneuploidies (Down Syndrome/Trisomy 21, Trisomy 13 + 18)  A requisition has been placed for a dating ultrasound.  Please call to get that scheduled.    At this ultrasound they will ask you if your would like to proceed with screening for genetic disorders that are listed above.  They will do that with an ultrasound at approximately 13 weeks and we also draw blood work for screening which includes the fetal sex if you desire to know.    Ultrasound for anatomy will be done at 19 weeks.  Based on risk factors and any concerns the maternal fetal medicine provider has, you may need a repeat ultrasound.  Healthy pregnancies that do not have any other concerns by the midwife or maternal fetal medicine do not have any repeat ultrasounds done.    Labs:   An order will be placed for your new ob labs.  Please get those done at the time of your ultrasound.  They will collect          multiple tubes of blood for new ob labs and also urine for STI testing and a urine culture.   If there are any concerns with any blood work or urine testing WE WILL CALL YOU OR COMMUNICATE VIA          Image Socket!!!   The biggest concerns our patients have is when they see their complete blood count.  The reference          range in the result is for a non pregnant person!  We will notify you if there is any need to start an iron supplement.  3.    At 26-28 weeks a glucose test is ordered to see if you have gestational diabetes.  We also reassess if you have          Anemia, which can be common in pregnancy  4.    Group B strep culture will be done at 36 weeks  "gestation.    We also recommend that you be screened once in your life for Cystic Fibrosis and Spinal Muscular Atrophy.  This assesses if we need your partner to be tested if you are a carrier of a gene that can be passed along to your baby.  For this to happen, both parents must be a carrier to the gene.    Choices for care and hospital for birth:  I am a Certified Nurse Midwife and practice in a group setting, which means that any of the midwives in my group practice may be there for your birth.  We care for healthy females during pregnancy and labor/birth.  We practice in collaboration with physicians within our group.  If there are any concerns with your pregnancy, labor or birth our physicians work closely with us.      The midwives in our practice strongly encourage you to explore the option of Centering Pregnancy which has been studied for better birth outcomes!  Care will be done in a group setting with 1:1 time with a midwife and then in depth education about every stage of your pregnancy, labor/birth,  care, feeding choices, pediatrician options, birth control and coping techniques for the first few weeks after birth.  We have day groups and our [place] location and a Monday evening group at [facility].  The groups follow your normal prenatal schedule and yes, we keep in contact and I see you at the end of your pregnancy.    There are certain medical conditions that \"risks you out\" of midwifery care that we are constantly assessing for.  Some conditions during your pregnancy that would risk you out of midwifery care would be:   Severe growth restriction of your baby   Labor/Birth  less than 35 weeks   Severe pre-eclampsia at any time during your pregnancy/labor/birth   Gestational Diabetes needing medication (insulin) to control your blood sugars   If you decline or do not complete your glucose testing to rule out diet controlled diabetes by 32 weeks   If you are diagnosed with chronic " hypertension during your pregnancy and need to start medication    The options for birth where we provide 24/7 Certified Nurse Midwifery coverage with a board certified OB/GYN, in house anesthesia and neonataology coverage are:    [facility] at [place], [place]    To call for questions regarding your care of if you are in labor is [phone]  My nurses name is Monika Webster who can answer questions and keep me updated should any questions or concerns arise during your pregnancy.    After hours, the answering service will ask you where you intended to give birth and connect you to the midwife on call at [facility] or the [facility].    If you would like to tour either facility:  Please call the Childbirth education line at [phone]    Danger signs to report:  Seek medical care immediately if you have pain that is doubling you over or vaginal bleeding that is heavier than a  period  Notify the office should you have any burning, urgency, frequency of urination or other concerning symptoms.    Medications that are safe for common discomforts of pregnancy:   Tylenol   Tums or Papaya extract for any upset stomach or heartburn   Zyrtec, Claritin, Benadryl for allergy symptoms   Sudafed or Robitussin for cold symptoms  [employer] is an michaela that is great for what medications are safe to take throughout your pregnancy and breastfeeding journey through the first year of life!  Well worth the $3.99    Work restrictions:  A normal healthy pregnancy without any complications are able to have the standard pregnancy work restrictions which is no pushing/pulling/lifting greater than 25 pounds independently    [place] paperwork  Can be brought to the office for us to fill out for when you are starting your maternity leave (either your scheduled date of going to the hospital or your due date).  We cannot give out  "early FMLA when there is no documented medical conditions that are considered \"normal pregnancy\" events.    Comfort measures   Chiropractors are great for alleviation of ligament pain   Yoga is good for your ligaments and mentally preparing for baby and labor.  A prenatal yoga class is recommended.  3.     Prenatal massages are fine  4.     A maternity support belt is an amazing thing that can help ligament pain -- can be purchased on Amazon  5.     Good supportive shoes are key to helping with ligament pain    Dental care  It is very important to see a dentist during your pregnancy for routine cleaning and also if you develop any dental pain during your pregnancy.  Healthy gums and teeth are very important during your pregnancy.  We can provide you with a dental letter if your dentist would like one.    Thank you for choosing our practice and [facility] for you healthcare!  I am excited to partner with you during this very special time!     If there is anything I can do to help make your experience is positive, please come to your visits with questions and concerns and do not be afraid to ask what is on your mind!  We will see you in the office every 4 weeks until you are 30 weeks, then every 2 weeks until 36 weeks and then weekly until your baby is born.    Until then, be well!  Roselyn    Fetal movements let us know your baby is doing well in the uterus.    Your baby will get into a normal routine with movements at about 24 weeks.  If you notice any changes in your baby's movement - start by eating and sitting down in a quiet place with your hands on your belly.  If your baby does not move 8-10 times in a two hour period after the meal, please let us know.    Baby will usually move more after meals.  Most mom's will report increase movement at night.    There are several vaccines we recommend during pregnancy:     TDAP (whooping cough vaccine) protects your baby against Pertussis (also called whooping " cough) by passing           antibodies to your baby.  This can be given anytime after 28 weeks in the office.   Flu vaccine during the months of September to end of March are important not only for you, but your baby.  Our office         can give you this vaccine during those months.  3.    RSV vaccine is given between 32-36 weeks at a retail pharmacy ([facility] [facility] [employer]  RSV can make a baby very sick as          they have very immature immune systems after birth.    We do recommend COVID boosters!    A contraceptive plan is important to think about!    A great resource is www.bedsider.org    There are many methods of birth control that provide different levels of efficacy.    There are many different types of birth control!    Pills (Effectiveness rate varies depending if you take your pill the same time every day!)   A pill that contains estrogen and progesterone that you would start at approximately 4 weeks postpartum   A pill that contains progesterone only that you could take immediately after leaving the hospital    Remember pills work, but you must take them the same time every single day which can be challenging with a new baby!    __________________________________________________________________________________________________________________________________     LARC METHODS (99% effectiveness)   An implant in your arm called Nexplanon that secretes a hormone called progesterone.  It is as big as a match stick          and provides you with continuous birth control for approximately 3 years.  This can be placed in the hospital before          you go home or at your postpartum visit in the office.  2.    IUDs are also highly effective and come hormonal free or contain a small amount of progesterone.  They can be placed          in the hosptial after your placenta comes out or at your postpartum appointment.  The last anywhere from 8-10 years           and can be removed at any time if you do not like this  method or if you want to have another baby.    _________________________________________________________________________________________________________________________________    TUBAL LIGATION (99% effectiveness)  If you have a Medicaid insurance as your primary or secondary insurance -- please note that you need to sign a federal consent form at least 30 days prior to the procedure.  If you sign the consent you may chose not to go through with the procedure!  If you have a  section, it would be completed during the surgery.  If you have a vaginal birth, we try our hardest to get this done immediately after the birth or the following day.    Your partner may chose to get a vasectomy.  Please remember you need something reliable to use until he is cleared by his physician that his specimen in clear!!!!    ________________________________________________________________________________________________________________________________    Barrier methods (approximately 80% effective)   Condoms   Diaphragm    _______________________________________________________________________________________________________________________________    Depo Provera (greater than 90% effective when received on time)    Depo Provera is an injection that contains progesterone that is given by injection every 10-12 weeks. It can be started in the hospital or at your postpartum visit.    ________________________________________________________________________________________________________________________________    Ring or Patch (greater than 90% effective)    The Ring (Nuva Ring) or Patch (Xulane) contain both estrogen and progesterone.  They can be started at 4 weeks postpartum and are easier than a pill -- especially since you will be busy taking care of your baby!      Whatever method of birth control you chose, you have to be comfortable with it.  You may not chose to use birth control at all.  Please remember that your  return to fertility (being able to get pregnant) can be rather quickly!  If you do not wish to have another baby soon, please ask questions about what method is best for you.

## 2024-01-05 LAB
ERYTHROCYTE [DISTWIDTH] IN BLOOD BY AUTOMATED COUNT: 13 % (ref 11.5–14.5)
FERRITIN SERPL-MCNC: 21 NG/ML
FOLATE SERPL-MCNC: 10.3 NG/ML
GLUCOSE 1H P 50 G GLC PO SERPL-MCNC: 111 MG/DL
HCT VFR BLD AUTO: 26.3 % (ref 36–46)
HGB BLD-MCNC: 8.7 G/DL (ref 12–16)
IRON SATN MFR SERPL: 16 %
IRON SERPL-MCNC: 70 UG/DL
MCH RBC QN AUTO: 33.2 PG (ref 26–34)
MCHC RBC AUTO-ENTMCNC: 33.1 G/DL (ref 32–36)
MCV RBC AUTO: 100 FL (ref 80–100)
NRBC BLD-RTO: 0 /100 WBCS (ref 0–0)
PLATELET # BLD AUTO: 132 X10*3/UL (ref 150–450)
RBC # BLD AUTO: 2.62 X10*6/UL (ref 4–5.2)
REFLEX ADDED, ANEMIA PANEL: NORMAL
T PALLIDUM AB SER QL: NONREACTIVE
TIBC SERPL-MCNC: 442 UG/DL
UIBC SERPL-MCNC: 372 UG/DL
VIT B12 SERPL-MCNC: 327 PG/ML
WBC # BLD AUTO: 6 X10*3/UL (ref 4.4–11.3)

## 2024-01-05 RX ORDER — FERROUS SULFATE 325(65) MG
325 TABLET, DELAYED RELEASE (ENTERIC COATED) ORAL 2 TIMES DAILY
Qty: 60 TABLET | Refills: 3 | Status: SHIPPED | OUTPATIENT
Start: 2024-01-05 | End: 2024-04-01 | Stop reason: HOSPADM

## 2024-01-08 ENCOUNTER — APPOINTMENT (OUTPATIENT)
Dept: OBSTETRICS AND GYNECOLOGY | Facility: CLINIC | Age: 24
End: 2024-01-08
Payer: MEDICAID

## 2024-01-10 ENCOUNTER — TELEPHONE (OUTPATIENT)
Dept: OBSTETRICS AND GYNECOLOGY | Facility: CLINIC | Age: 24
End: 2024-01-10
Payer: MEDICAID

## 2024-01-11 NOTE — TELEPHONE ENCOUNTER
RN called [employer] [phone]  to request Breast pump per patient's request. RN was told that the member ID # is not correct which is provided on the card on file. Representative requested pt to call company to discuss.

## 2024-02-01 ENCOUNTER — ROUTINE PRENATAL (OUTPATIENT)
Dept: OBSTETRICS AND GYNECOLOGY | Facility: CLINIC | Age: 24
End: 2024-02-01
Payer: MEDICAID

## 2024-02-01 VITALS — SYSTOLIC BLOOD PRESSURE: 104 MMHG | DIASTOLIC BLOOD PRESSURE: 60 MMHG | WEIGHT: 106 LBS | BODY MASS INDEX: 19.39 KG/M2

## 2024-02-01 DIAGNOSIS — O09.623 SUPERVISION OF SUBSEQUENT PREGNANCY, LESS THAN 16 YEARS OLD IN THIRD TRIMESTER (HHS-HCC): Primary | ICD-10-CM

## 2024-02-01 DIAGNOSIS — Z3A.30 30 WEEKS GESTATION OF PREGNANCY (HHS-HCC): ICD-10-CM

## 2024-02-01 PROCEDURE — 82607 VITAMIN B-12: CPT

## 2024-02-01 PROCEDURE — 82746 ASSAY OF FOLIC ACID SERUM: CPT

## 2024-02-01 PROCEDURE — 85027 COMPLETE CBC AUTOMATED: CPT

## 2024-02-01 PROCEDURE — 99214 OFFICE O/P EST MOD 30 MIN: CPT | Performed by: ADVANCED PRACTICE MIDWIFE

## 2024-02-01 PROCEDURE — 36415 COLL VENOUS BLD VENIPUNCTURE: CPT

## 2024-02-01 NOTE — PROGRESS NOTES
"Assessment/Plan   Problem List Items Addressed This Visit    None  Visit Diagnoses         Codes    Supervision of subsequent pregnancy, less than 16 years old in third trimester    -  Primary O09.623    30 weeks gestation of pregnancy     Z3A.30    Anemia of mother in pregnancy, delivered with postpartum condition     O99.03    Relevant Orders    CBC Anemia Panel With Reflex,Pregnancy            CBC anemia panel today  Reviewed s/sx of PTL, warning signs, fetal movement counts, and when to call provider  Follow up in 2 week for a routine prenatal visit.    TARAN Huerta-LEONARD Leigh     Misael Diaz is a 23 y.o.  at 30w2d with a working estimated date of delivery of 2024, by Last Menstrual Period who presents for a routine prenatal visit. Reports taking iron every other day. Agrees to CBC today.     Vaginal bleeding no  Leakage of fluid no  Decreased fetal movements no  Contractions no    Her pregnancy is complicated by:  Pregnancy Problems (from 22 to present)       Problem Noted Resolved    Nausea and vomiting in pregnancy 10/6/2023 by Zoë Huitron No    Priority:  Medium      Pregnancy 10/6/2023 by Zoë Huitron 2023 by Zenaida VACA MD             Objective   Physical Exam:      TWG: 3.629 kg (8 lb)  Expected Total Weight Gain: 12.5 kg (27 lb)-18 kg (39 lb)   Pregravid BMI: 17.19                     Postpartum Depression: Not on file        Prenatal Labs  Lab Results   Component Value Date    HGB 8.7 (L) 2024    HCT 26.3 (L) 2024     (L) 2024    ABO A 2023    LABRH POS 2023    NEISSGONOAMP NEGATIVE 2023    CHLAMTRACAMP NEGATIVE 2023    SYPHT Nonreactive 2024    HEPBSAG NONREACTIVE 2023    HIV1X2 NONREACTIVE 2023    URINECULTURE NO GROWTH 2023     Lab Results   Component Value Date    GLUC1P 111 2024     No results found for: \"GRPBSTREP\"     Education provided  " no rashes, no jaundice present, good turgor

## 2024-02-02 LAB
ERYTHROCYTE [DISTWIDTH] IN BLOOD BY AUTOMATED COUNT: 12.9 % (ref 11.5–14.5)
FOLATE SERPL-MCNC: 11.7 NG/ML
HCT VFR BLD AUTO: 28.7 % (ref 36–46)
HGB BLD-MCNC: 9.1 G/DL (ref 12–16)
MCH RBC QN AUTO: 32 PG (ref 26–34)
MCHC RBC AUTO-ENTMCNC: 31.7 G/DL (ref 32–36)
MCV RBC AUTO: 101 FL (ref 80–100)
NRBC BLD-RTO: 0 /100 WBCS (ref 0–0)
PLATELET # BLD AUTO: 145 X10*3/UL (ref 150–450)
RBC # BLD AUTO: 2.84 X10*6/UL (ref 4–5.2)
REFLEX ADDED, ANEMIA PANEL: NORMAL
VIT B12 SERPL-MCNC: 313 PG/ML
WBC # BLD AUTO: 7.1 X10*3/UL (ref 4.4–11.3)

## 2024-02-05 ENCOUNTER — HOSPITAL ENCOUNTER (OUTPATIENT)
Dept: RADIOLOGY | Facility: CLINIC | Age: 24
Discharge: HOME | End: 2024-02-05
Payer: MEDICAID

## 2024-02-05 DIAGNOSIS — Z03.74 ENCOUNTER FOR SUSPECTED PROBLEM WITH FETAL GROWTH RULED OUT: ICD-10-CM

## 2024-02-05 DIAGNOSIS — O36.5930 MATERNAL CARE FOR OTHER KNOWN OR SUSPECTED POOR FETAL GROWTH, THIRD TRIMESTER, NOT APPLICABLE OR UNSPECIFIED (HHS-HCC): ICD-10-CM

## 2024-02-05 PROCEDURE — 76816 OB US FOLLOW-UP PER FETUS: CPT | Performed by: OBSTETRICS & GYNECOLOGY

## 2024-02-05 PROCEDURE — 76819 FETAL BIOPHYS PROFIL W/O NST: CPT | Performed by: OBSTETRICS & GYNECOLOGY

## 2024-02-05 PROCEDURE — 76816 OB US FOLLOW-UP PER FETUS: CPT

## 2024-02-08 ENCOUNTER — CLINICAL SUPPORT (OUTPATIENT)
Dept: RHEUMATOLOGY | Facility: CLINIC | Age: 24
End: 2024-02-08
Payer: MEDICAID

## 2024-02-08 VITALS
SYSTOLIC BLOOD PRESSURE: 110 MMHG | OXYGEN SATURATION: 100 % | TEMPERATURE: 98.1 F | DIASTOLIC BLOOD PRESSURE: 61 MMHG | HEART RATE: 107 BPM

## 2024-02-08 PROCEDURE — 2500000004 HC RX 250 GENERAL PHARMACY W/ HCPCS (ALT 636 FOR OP/ED): Performed by: INTERNAL MEDICINE

## 2024-02-08 PROCEDURE — 96374 THER/PROPH/DIAG INJ IV PUSH: CPT

## 2024-02-08 RX ORDER — ALBUTEROL SULFATE 0.83 MG/ML
3 SOLUTION RESPIRATORY (INHALATION) AS NEEDED
Status: CANCELLED | OUTPATIENT
Start: 2024-02-08

## 2024-02-08 RX ORDER — DIPHENHYDRAMINE HYDROCHLORIDE 50 MG/ML
50 INJECTION INTRAMUSCULAR; INTRAVENOUS AS NEEDED
OUTPATIENT
Start: 2024-02-12

## 2024-02-08 RX ORDER — EPINEPHRINE 0.3 MG/.3ML
0.3 INJECTION SUBCUTANEOUS EVERY 5 MIN PRN
Status: CANCELLED | OUTPATIENT
Start: 2024-02-08

## 2024-02-08 RX ORDER — FAMOTIDINE 10 MG/ML
20 INJECTION INTRAVENOUS ONCE AS NEEDED
Status: CANCELLED | OUTPATIENT
Start: 2024-02-08

## 2024-02-08 RX ORDER — ALBUTEROL SULFATE 0.83 MG/ML
3 SOLUTION RESPIRATORY (INHALATION) AS NEEDED
OUTPATIENT
Start: 2024-02-12

## 2024-02-08 RX ORDER — DIPHENHYDRAMINE HYDROCHLORIDE 50 MG/ML
50 INJECTION INTRAMUSCULAR; INTRAVENOUS AS NEEDED
Status: CANCELLED | OUTPATIENT
Start: 2024-02-08

## 2024-02-08 RX ORDER — EPINEPHRINE 0.3 MG/.3ML
0.3 INJECTION SUBCUTANEOUS EVERY 5 MIN PRN
OUTPATIENT
Start: 2024-02-12

## 2024-02-08 RX ORDER — FAMOTIDINE 10 MG/ML
20 INJECTION INTRAVENOUS ONCE AS NEEDED
OUTPATIENT
Start: 2024-02-12

## 2024-02-08 RX ADMIN — IRON SUCROSE 300 MG: 20 INJECTION, SOLUTION INTRAVENOUS at 14:44

## 2024-02-08 NOTE — PROGRESS NOTES
Patient tolerated first infusion of venofer without signs or symptoms of adverse reaction.  Induction schedule explained to patient, provided print out of appointments.

## 2024-02-10 ENCOUNTER — HOSPITAL ENCOUNTER (OUTPATIENT)
Facility: HOSPITAL | Age: 24
Discharge: HOME | End: 2024-02-10
Attending: OBSTETRICS & GYNECOLOGY | Admitting: OBSTETRICS & GYNECOLOGY
Payer: MEDICAID

## 2024-02-10 ENCOUNTER — HOSPITAL ENCOUNTER (OUTPATIENT)
Facility: HOSPITAL | Age: 24
End: 2024-02-10
Attending: OBSTETRICS & GYNECOLOGY | Admitting: OBSTETRICS & GYNECOLOGY
Payer: MEDICAID

## 2024-02-10 PROCEDURE — 99213 OFFICE O/P EST LOW 20 MIN: CPT | Performed by: STUDENT IN AN ORGANIZED HEALTH CARE EDUCATION/TRAINING PROGRAM

## 2024-02-10 PROCEDURE — 2500000001 HC RX 250 WO HCPCS SELF ADMINISTERED DRUGS (ALT 637 FOR MEDICARE OP)

## 2024-02-10 PROCEDURE — 99214 OFFICE O/P EST MOD 30 MIN: CPT | Mod: 25,27

## 2024-02-10 RX ORDER — ONDANSETRON 4 MG/1
4 TABLET, FILM COATED ORAL EVERY 6 HOURS PRN
Status: DISCONTINUED | OUTPATIENT
Start: 2024-02-10 | End: 2024-02-11 | Stop reason: HOSPADM

## 2024-02-10 RX ORDER — CYCLOBENZAPRINE HCL 10 MG
10 TABLET ORAL ONCE
Status: COMPLETED | OUTPATIENT
Start: 2024-02-10 | End: 2024-02-10

## 2024-02-10 RX ORDER — ONDANSETRON HYDROCHLORIDE 2 MG/ML
4 INJECTION, SOLUTION INTRAVENOUS EVERY 6 HOURS PRN
Status: DISCONTINUED | OUTPATIENT
Start: 2024-02-10 | End: 2024-02-11 | Stop reason: HOSPADM

## 2024-02-10 RX ORDER — ACETAMINOPHEN 325 MG/1
975 TABLET ORAL ONCE
Status: COMPLETED | OUTPATIENT
Start: 2024-02-10 | End: 2024-02-10

## 2024-02-10 RX ADMIN — CYCLOBENZAPRINE 10 MG: 10 TABLET, FILM COATED ORAL at 20:49

## 2024-02-10 RX ADMIN — ACETAMINOPHEN 975 MG: 325 TABLET ORAL at 20:49

## 2024-02-10 SDOH — HEALTH STABILITY: MENTAL HEALTH: WISH TO BE DEAD (PAST 1 MONTH): NO

## 2024-02-10 SDOH — HEALTH STABILITY: MENTAL HEALTH: WERE YOU ABLE TO COMPLETE ALL THE BEHAVIORAL HEALTH SCREENINGS?: YES

## 2024-02-10 SDOH — HEALTH STABILITY: MENTAL HEALTH: HAVE YOU USED ANY PRESCRIPTION DRUGS OTHER THAN PRESCRIBED IN THE PAST 12 MONTHS?: NO

## 2024-02-10 SDOH — HEALTH STABILITY: MENTAL HEALTH: HAVE YOU USED ANY SUBSTANCES (CANABIS, COCAINE, HEROIN, HALLUCINOGENS, INHALANTS, ETC.) IN THE PAST 12 MONTHS?: NO

## 2024-02-10 SDOH — SOCIAL STABILITY: SOCIAL INSECURITY: ARE THERE ANY APPARENT SIGNS OF INJURIES/BEHAVIORS THAT COULD BE RELATED TO ABUSE/NEGLECT?: NO

## 2024-02-10 SDOH — SOCIAL STABILITY: SOCIAL INSECURITY: HAVE YOU HAD THOUGHTS OF HARMING ANYONE ELSE?: NO

## 2024-02-10 SDOH — SOCIAL STABILITY: SOCIAL INSECURITY: ABUSE SCREEN: ADULT

## 2024-02-10 SDOH — SOCIAL STABILITY: SOCIAL INSECURITY: VERBAL ABUSE: DENIES

## 2024-02-10 SDOH — SOCIAL STABILITY: SOCIAL INSECURITY: PHYSICAL ABUSE: DENIES

## 2024-02-10 SDOH — SOCIAL STABILITY: SOCIAL INSECURITY: DO YOU FEEL ANYONE HAS EXPLOITED OR TAKEN ADVANTAGE OF YOU FINANCIALLY OR OF YOUR PERSONAL PROPERTY?: NO

## 2024-02-10 SDOH — ECONOMIC STABILITY: HOUSING INSECURITY: DO YOU FEEL UNSAFE GOING BACK TO THE PLACE WHERE YOU ARE LIVING?: NO

## 2024-02-10 SDOH — SOCIAL STABILITY: SOCIAL INSECURITY: DOES ANYONE TRY TO KEEP YOU FROM HAVING/CONTACTING OTHER FRIENDS OR DOING THINGS OUTSIDE YOUR HOME?: NO

## 2024-02-10 SDOH — HEALTH STABILITY: MENTAL HEALTH: NON-SPECIFIC ACTIVE SUICIDAL THOUGHTS (PAST 1 MONTH): NO

## 2024-02-10 SDOH — HEALTH STABILITY: MENTAL HEALTH: SUICIDAL BEHAVIOR (LIFETIME): NO

## 2024-02-10 SDOH — SOCIAL STABILITY: SOCIAL INSECURITY: ARE YOU OR HAVE YOU BEEN THREATENED OR ABUSED PHYSICALLY, EMOTIONALLY, OR SEXUALLY BY ANYONE?: NO

## 2024-02-10 SDOH — SOCIAL STABILITY: SOCIAL INSECURITY: HAS ANYONE EVER THREATENED TO HURT YOUR FAMILY OR YOUR PETS?: NO

## 2024-02-10 ASSESSMENT — PATIENT HEALTH QUESTIONNAIRE - PHQ9
SUM OF ALL RESPONSES TO PHQ9 QUESTIONS 1 & 2: 0
1. LITTLE INTEREST OR PLEASURE IN DOING THINGS: NOT AT ALL
2. FEELING DOWN, DEPRESSED OR HOPELESS: NOT AT ALL

## 2024-02-10 ASSESSMENT — LIFESTYLE VARIABLES
HOW MANY STANDARD DRINKS CONTAINING ALCOHOL DO YOU HAVE ON A TYPICAL DAY: PATIENT DOES NOT DRINK
AUDIT-C TOTAL SCORE: 0
HOW OFTEN DO YOU HAVE A DRINK CONTAINING ALCOHOL: NEVER
HOW OFTEN DO YOU HAVE 6 OR MORE DRINKS ON ONE OCCASION: NEVER
AUDIT-C TOTAL SCORE: 0
SKIP TO QUESTIONS 9-10: 1

## 2024-02-10 ASSESSMENT — PAIN SCALES - GENERAL
PAINLEVEL_OUTOF10: 7
PAINLEVEL_OUTOF10: 8

## 2024-02-11 VITALS
SYSTOLIC BLOOD PRESSURE: 112 MMHG | HEIGHT: 62 IN | BODY MASS INDEX: 20.53 KG/M2 | WEIGHT: 111.55 LBS | OXYGEN SATURATION: 100 % | DIASTOLIC BLOOD PRESSURE: 56 MMHG | RESPIRATION RATE: 16 BRPM | TEMPERATURE: 98.4 F | HEART RATE: 92 BPM

## 2024-02-11 NOTE — H&P
Obstetrical Triage History and Physical     [facility] JAY Diaz is a 23 y.o.  at 31w4d     Chief Complaint: Abdominal Cramping and vaginal pressure    Assessment/Plan    Round Ligament Pain/Pelvic Girdle Pain  - Cervix: closed/long/high  - Tebbetts: quiet  - Denies vaginitis symptoms  - Encouraged increasing oral hydration  - Tylenol and flexeril given     IUP at 31w4d   - NST appropriate for gestational age   - Good fetal movement  - Continue routine prenatal care  - Precautions to return discussed     Maternal Well-being  - Vital signs stable and WNL  - All questions and concerns addressed    Plan signed out to night team.  Discussed 2hr recheck as still uncomfortable after tylenol and flexeril administration.  If unchanged, discussed pain management with patient.    TARAN Whiting-CNP     Night team update:  Cervix rechecked, closed. FHT Cat I. UA benign. Low suspicion for PTL, patient appropriate for discharge home with return precautions in place. Discussed rest, heat packs, belly band, bowel regimen. Prenatal appt next week. Seen and discussed with Dr. Buenrostro.    Chelo Woods MD  OBGYN    Active Problems:  There are no active Hospital Problems.      Pregnancy Problems (from 22 to present)       Problem Noted Resolved    Nausea and vomiting in pregnancy 10/6/2023 by Zoë Huitron No    Priority:  Medium      Pregnancy 10/6/2023 by Zoë Huitron 2023 by Zenaida VACA MD          Subjective   [place] is here for vaginal pressure.  States the pain is a 10/10.  Nothing helps.  Located directly above pubic bone.  Denies changes in urination or discharge.  Denies vaginal itchiness.  Pain is constant and made worse with fetal movements.  Denies VB, LOF, and endorses good fetal movement.     Obstetrical History   OB History    Para Term  AB Living   2 0 0 0 1 0   SAB IAB Ectopic Multiple Live Births   1 0 0 0 0      # Outcome Date GA Lbr Jack/2nd Weight Sex Delivery Anes  PTL Lv   2 Current            1 SAB 03/23/23 10w0d              Past Medical History  Past Medical History:   Diagnosis Date    Dermatitis, unspecified 04/26/2016    Eczema, unspecified type    Dysmenorrhea 10/06/2023    Encounter for immunization 04/26/2016    Immunization due    Encounter for immunization     Immunization due    Failure to thrive (child) 04/26/2016    Slow weight gain, child    Follicular cyst of the skin and subcutaneous tissue, unspecified 04/26/2016    Skin cyst    Other specified health status 08/26/2014    Known health problems: none    Other specified health status 04/26/2016    No pertinent past surgical history        Past Surgical History   Past Surgical History:   Procedure Laterality Date    CT AORTA AND BILATERAL ILIOFEMORAL RUNOFF ANGIOGRAM W AND/OR WO IV CONTRAST  4/26/2022    CT AORTA AND BILATERAL ILIOFEMORAL RUNOFF ANGIOGRAM W AND/OR WO IV CONTRAST 4/26/2022 [facility]    OTHER SURGICAL HISTORY  08/08/2019    No history of surgery       Social History  Social History     Tobacco Use    Smoking status: Never    Smokeless tobacco: Never   Substance Use Topics    Alcohol use: Not Currently     Substance and Sexual Activity   Drug Use Never       Allergies  Patient has no known allergies.     Medications  Medications Prior to Admission   Medication Sig Dispense Refill Last Dose    diphenhydrAMINE (BenadryL) 25 mg capsule Take 2 capsules (50 mg) by mouth every 8 hours if needed for itching (headache in pregnancy) for up to 10 days. 30 capsule 2     ferrous sulfate 325 (65 Fe) MG EC tablet Take 1 tablet by mouth 2 times a day. Do not crush, chew, or split. 60 tablet 3     prenatal no.167-folic acid-dha 400 mcg- 25 mg tablet,chewable Chew 1 tablet once daily. 90 tablet 3     prenatal vit,cyndi 74-iron-folic 27 mg iron- 1 mg tablet Take 1 tablet by mouth once daily. 30 each 11 2/9/2024       Objective    Last Vitals  Temp Pulse Resp BP MAP O2 Sat   36.9 °C (98.4 °F) 106 18 116/56    100 %     Physical Examination  Physical Exam  Exam conducted with a chaperone present.   Constitutional:       Appearance: Normal appearance.   HENT:      Head: Normocephalic.   Cardiovascular:      Rate and Rhythm: Normal rate.   Pulmonary:      Effort: Pulmonary effort is normal.   Abdominal:      Comments: Gravid   Genitourinary:     Comments: Cervix: closed/long/high    FHT: 135, mod variability, +accels, -decels   [place]:  Musculoskeletal:         General: Normal range of motion.   Skin:     General: Skin is warm.   Neurological:      Mental Status: She is alert and oriented to person, place, and time.   Psychiatric:         Mood and Affect: Mood normal.         Behavior: Behavior normal.          Lab Review  Labs in chart were reviewed.

## 2024-02-12 ENCOUNTER — DOCUMENTATION (OUTPATIENT)
Dept: OBSTETRICS AND GYNECOLOGY | Facility: HOSPITAL | Age: 24
End: 2024-02-12
Payer: MEDICAID

## 2024-02-12 NOTE — PROGRESS NOTES
Patient support band form filled out, signed and faxed to [phone].   Fax successful on 02-12-24.

## 2024-02-13 ENCOUNTER — TELEPHONE (OUTPATIENT)
Dept: OBSTETRICS AND GYNECOLOGY | Facility: CLINIC | Age: 24
End: 2024-02-13
Payer: MEDICAID

## 2024-02-13 NOTE — TELEPHONE ENCOUNTER
Name/ verified  Pt is 32 wks gest  She states that via her MyChart she noticed her next two upcoming [facility] apts were cancelled on 2/15, . She does not know why. She does not have a contact # for the department.  RN will research why apt were cancelled and follow up with pt.

## 2024-02-13 NOTE — TELEPHONE ENCOUNTER
RN spoke with Roselyn Juarez CNM inquiring why pt's Iron Infusions at [facility] location were cancelled. Per Roselyn-they are no longer providing infusion services there. Email sent to [id number] to request department to reach out to patient to schedule future infusions x 2. Pt was contacted by Evette ARNOLD.

## 2024-02-15 ENCOUNTER — APPOINTMENT (OUTPATIENT)
Dept: RHEUMATOLOGY | Facility: CLINIC | Age: 24
End: 2024-02-15
Payer: MEDICAID

## 2024-02-15 ENCOUNTER — ROUTINE PRENATAL (OUTPATIENT)
Dept: OBSTETRICS AND GYNECOLOGY | Facility: CLINIC | Age: 24
End: 2024-02-15
Payer: MEDICAID

## 2024-02-15 VITALS — WEIGHT: 107 LBS | BODY MASS INDEX: 19.57 KG/M2 | DIASTOLIC BLOOD PRESSURE: 58 MMHG | SYSTOLIC BLOOD PRESSURE: 118 MMHG

## 2024-02-15 DIAGNOSIS — Z3A.32 32 WEEKS GESTATION OF PREGNANCY (HHS-HCC): Primary | ICD-10-CM

## 2024-02-15 DIAGNOSIS — Z34.03 ENCOUNTER FOR PRENATAL CARE OF FIRST PREGNANCY, THIRD TRIMESTER (HHS-HCC): ICD-10-CM

## 2024-02-15 PROCEDURE — 99214 OFFICE O/P EST MOD 30 MIN: CPT | Performed by: ADVANCED PRACTICE MIDWIFE

## 2024-02-15 RX ORDER — CYCLOBENZAPRINE HCL 5 MG
5 TABLET ORAL 3 TIMES DAILY PRN
Qty: 30 TABLET | Refills: 0 | Status: SHIPPED | OUTPATIENT
Start: 2024-02-15 | End: 2024-02-25

## 2024-02-15 NOTE — PROGRESS NOTES
Assessment/Plan   Problem List Items Addressed This Visit    None  Visit Diagnoses         Codes    32 weeks gestation of pregnancy    -  Primary Z3A.32    Encounter for prenatal care of first pregnancy, third trimester     Z34.03              Reviewed s/sx of PTL, warning signs, fetal movement counts, and when to call provider  Follow up in 2 week for a routine prenatal visit.   Patient with ligament pain and insomnia -- Flexeril 5mg tablets given for bedtime    Patient asking about paperwork for intermittent FMLA -- discussed with patient that we can give off for appointments, etc but we do not give a blanket intermittent FMLA for a normal pregnancy.    Patient had 1 iron infusion at [facility] center office, is awaiting notification for 2 next infusions at [facility]        Areli Durand RN    Subjective     Misael Diaz is a 23 y.o.  at 32w2d with a working estimated date of delivery of 2024, by Last Menstrual Period who presents for a routine prenatal visit. Patient states she presented to the hospital 2/10/2024 with intense lower abdominal cramping. Today feeling suprapubic pressure intermittently and cannot sleep at night with lower back pain. Complaint of nausea with eating and no appetite.     Vaginal bleeding no  Leakage of fluid no   Decreased fetal movements no  Contractions lower abdominal cramping    Her pregnancy is complicated by:  Pregnancy Problems (from 22 to present)       Problem Noted Resolved    Nausea and vomiting in pregnancy 10/6/2023 by Zoë Huitron No    Priority:  Medium      Pregnancy 10/6/2023 by Zoë Huitron 2023 by Zenaida VACA MD             Objective   Physical Exam:   Weight: 48.5 kg (107 lb)  TW.897 kg (13 lb)  Expected Total Weight Gain: 12.5 kg (27 lb)-18 kg (39 lb)   Pregravid BMI: 17.19  BP: 118/58                  Postpartum Depression: Not on file        Prenatal Labs  Lab Results   Component Value Date    HGB 9.1 (L) 2024    HCT  "28.7 (L) 02/01/2024     (L) 02/01/2024    ABO A 08/30/2023    LABRH POS 08/30/2023    NEISSGONOAMP NEGATIVE 08/01/2023    CHLAMTRACAMP NEGATIVE 08/01/2023    SYPHT Nonreactive 01/04/2024    HEPBSAG NONREACTIVE 08/30/2023    HIV1X2 NONREACTIVE 08/30/2023    URINECULTURE NO GROWTH 08/14/2023     Lab Results   Component Value Date    GLUC1P 111 01/04/2024     No results found for: \"GRPBSTREP\"     Education provided  If you experience:     Contractions that are 5 minutes a part for 2 hours that you cannot walk or talk through   A gush of fluid from your vagina   Bleeding that is heavier than a period   Decrease fetal movements or changes in your baby's movements   A headache that does not go away with Tylenol or rest     During the weekday office hours please call [phone]    After hours please call [phone].  You will tell the answering service if you are planning on giving birth at [facility] or [facility] to be transferred to the correct midwife on call.      DO NOT USE Desk MESSAGES - THEY ARE NOT MONITORED 24/7   "

## 2024-02-20 ENCOUNTER — TELEPHONE (OUTPATIENT)
Dept: OBSTETRICS AND GYNECOLOGY | Facility: CLINIC | Age: 24
End: 2024-02-20
Payer: MEDICAID

## 2024-02-20 NOTE — TELEPHONE ENCOUNTER
Name/ verified  Pt is 33 wk preg. She states that she lost part of her mucous plug last night. She denies any bleeding or abdominal pain or contractions. Good +FM noted. Education provided to pt on what to look honeycutt if SROM occurs. Reviewed S&S of PTL and pt advised to continue to monitor at this time and call back with any changes.  Pt verbalizes understanding.

## 2024-02-22 ENCOUNTER — APPOINTMENT (OUTPATIENT)
Dept: RHEUMATOLOGY | Facility: CLINIC | Age: 24
End: 2024-02-22
Payer: MEDICAID

## 2024-02-29 ENCOUNTER — TELEPHONE (OUTPATIENT)
Dept: OBSTETRICS AND GYNECOLOGY | Facility: HOSPITAL | Age: 24
End: 2024-02-29

## 2024-02-29 ENCOUNTER — ROUTINE PRENATAL (OUTPATIENT)
Dept: OBSTETRICS AND GYNECOLOGY | Facility: CLINIC | Age: 24
End: 2024-02-29
Payer: MEDICAID

## 2024-02-29 VITALS — SYSTOLIC BLOOD PRESSURE: 110 MMHG | DIASTOLIC BLOOD PRESSURE: 80 MMHG | WEIGHT: 108 LBS | BODY MASS INDEX: 19.75 KG/M2

## 2024-02-29 DIAGNOSIS — Z3A.34 34 WEEKS GESTATION OF PREGNANCY (HHS-HCC): ICD-10-CM

## 2024-02-29 DIAGNOSIS — Z34.03 ENCOUNTER FOR PRENATAL CARE IN THIRD TRIMESTER OF FIRST PREGNANCY (HHS-HCC): Primary | ICD-10-CM

## 2024-02-29 DIAGNOSIS — O21.9 NAUSEA AND VOMITING IN PREGNANCY (HHS-HCC): ICD-10-CM

## 2024-02-29 DIAGNOSIS — O47.9 BRAXTON HICKS CONTRACTIONS (HHS-HCC): ICD-10-CM

## 2024-02-29 PROCEDURE — 99214 OFFICE O/P EST MOD 30 MIN: CPT | Performed by: ADVANCED PRACTICE MIDWIFE

## 2024-02-29 PROCEDURE — 87086 URINE CULTURE/COLONY COUNT: CPT

## 2024-02-29 PROCEDURE — 59025 FETAL NON-STRESS TEST: CPT | Performed by: ADVANCED PRACTICE MIDWIFE

## 2024-02-29 RX ORDER — EPINEPHRINE 0.3 MG/.3ML
0.3 INJECTION SUBCUTANEOUS EVERY 5 MIN PRN
Status: CANCELLED | OUTPATIENT
Start: 2024-02-29

## 2024-02-29 RX ORDER — DIPHENHYDRAMINE HYDROCHLORIDE 50 MG/ML
50 INJECTION INTRAMUSCULAR; INTRAVENOUS AS NEEDED
Status: CANCELLED | OUTPATIENT
Start: 2024-02-29

## 2024-02-29 RX ORDER — FAMOTIDINE 10 MG/ML
20 INJECTION INTRAVENOUS ONCE AS NEEDED
Status: CANCELLED | OUTPATIENT
Start: 2024-02-29

## 2024-02-29 RX ORDER — ALBUTEROL SULFATE 0.83 MG/ML
3 SOLUTION RESPIRATORY (INHALATION) AS NEEDED
Status: CANCELLED | OUTPATIENT
Start: 2024-02-29

## 2024-02-29 ASSESSMENT — ENCOUNTER SYMPTOMS
PSYCHIATRIC NEGATIVE: 0
CONSTITUTIONAL NEGATIVE: 0
EYES NEGATIVE: 0
ENDOCRINE NEGATIVE: 0
CARDIOVASCULAR NEGATIVE: 0
RESPIRATORY NEGATIVE: 0
HEMATOLOGIC/LYMPHATIC NEGATIVE: 0
GASTROINTESTINAL NEGATIVE: 0
ALLERGIC/IMMUNOLOGIC NEGATIVE: 0
NEUROLOGICAL NEGATIVE: 0
MUSCULOSKELETAL NEGATIVE: 0

## 2024-02-29 NOTE — PROGRESS NOTES
Assessment/Plan   Problem List Items Addressed This Visit             ICD-10-CM    Nausea and vomiting in pregnancy O21.9     Other Visit Diagnoses         Codes    Encounter for prenatal care in third trimester of first pregnancy    -  Primary Z34.03    34 weeks gestation of pregnancy     Z3A.34            Discussed routine GBS screening, to be completed next visit  Reviewed s/sx of PTL, warning signs, fetal movement counts, and when to call provider  Follow up in 2 week for a routine prenatal visit.    Areli Durand RN    Subjective     [facility] JAY Diaz is a 23 y.o.  at 34w2d with a working estimated date of delivery of 2024, by Last Menstrual Period who presents for a routine prenatal visit. Patient states she is still not sleeping at all but reports she did not fill her prescription for Flexeril. Patient states last Friday she started having contractions 2024 they have gotten progressively worse rating 8/10 and last two days she feels like every 5 minutes. Patient reports loose stools since Friday as well but patient states she took Miralax. Patient reports she has sexual intercourse 2 days ago and has frequent intercourse with her partner.     Vaginal bleeding no  Leakage of fluid no  Decreased fetal movements no  Contractions: Patient states last Friday she started having contractions 2024 they have gotten progressively worse rating 8/10 and last two days she feels like every 5 minutes    Her pregnancy is complicated by:  Pregnancy Problems (from 22 to present)       Problem Noted Resolved    Nausea and vomiting in pregnancy 10/6/2023 by Zoë Huitron No    Priority:  Medium      Pregnancy 10/6/2023 by Zoë Huitron 2023 by Zenaida VACA MD             Objective   Physical Exam:   Weight: 49 kg (108 lb)  TW.35 kg (14 lb)  Expected Total Weight Gain: 12.5 kg (27 lb)-18 kg (39 lb)   Pregravid BMI: 17.19  BP: 110/80                      Postpartum Depression: Not  "on file        Prenatal Labs  Lab Results   Component Value Date    HGB 9.1 (L) 02/01/2024    HCT 28.7 (L) 02/01/2024     (L) 02/01/2024    ABO A 08/30/2023    LABRH POS 08/30/2023    NEISSGONOAMP NEGATIVE 08/01/2023    CHLAMTRACAMP NEGATIVE 08/01/2023    SYPHT Nonreactive 01/04/2024    HEPBSAG NONREACTIVE 08/30/2023    HIV1X2 NONREACTIVE 08/30/2023    URINECULTURE NO GROWTH 08/14/2023     Lab Results   Component Value Date    GLUC1P 111 01/04/2024     No results found for: \"GRPBSTREP\"     Reactive NST in office with no contractions noted. POCT urine collected and manually resulted large leukocytes and large blood, culture sent and will follow up with results. Patient is in need of iron infusion will schedule. Patient encouraged to fill prescription for Flexeril. Follow up in 2 weeks for routine prenatal visit or sooner if needed.         Education provided    If you experience:     Contractions that are 5 minutes a part for 2 hours that you cannot walk or talk through   A gush of fluid from your vagina   Bleeding that is heavier than a period   Decrease fetal movements or changes in your baby's movements   A headache that does not go away with Tylenol or rest     During the weekday office hours please call [phone]    After hours please call [phone].  You will tell the answering service if you are planning on giving birth at [facility] or [facility] to be transferred to the correct midwife on call.      DO NOT USE [employer] MESSAGES - THEY ARE NOT MONITORED 24/7   "

## 2024-03-01 LAB — BACTERIA UR CULT: NO GROWTH

## 2024-03-04 ENCOUNTER — INFUSION (OUTPATIENT)
Dept: INFUSION THERAPY | Facility: HOSPITAL | Age: 24
End: 2024-03-04
Payer: MEDICAID

## 2024-03-04 VITALS
BODY MASS INDEX: 20.12 KG/M2 | RESPIRATION RATE: 12 BRPM | DIASTOLIC BLOOD PRESSURE: 60 MMHG | WEIGHT: 110 LBS | SYSTOLIC BLOOD PRESSURE: 96 MMHG | OXYGEN SATURATION: 99 % | TEMPERATURE: 98.2 F | HEART RATE: 70 BPM

## 2024-03-04 DIAGNOSIS — O21.9 NAUSEA AND VOMITING IN PREGNANCY (HHS-HCC): ICD-10-CM

## 2024-03-04 PROCEDURE — 96365 THER/PROPH/DIAG IV INF INIT: CPT

## 2024-03-04 PROCEDURE — 96366 THER/PROPH/DIAG IV INF ADDON: CPT

## 2024-03-04 PROCEDURE — 2500000004 HC RX 250 GENERAL PHARMACY W/ HCPCS (ALT 636 FOR OP/ED)

## 2024-03-04 RX ORDER — ALBUTEROL SULFATE 0.83 MG/ML
3 SOLUTION RESPIRATORY (INHALATION) AS NEEDED
OUTPATIENT
Start: 2024-03-04

## 2024-03-04 RX ORDER — FAMOTIDINE 10 MG/ML
20 INJECTION INTRAVENOUS ONCE AS NEEDED
OUTPATIENT
Start: 2024-03-04

## 2024-03-04 RX ORDER — ALBUTEROL SULFATE 0.83 MG/ML
3 SOLUTION RESPIRATORY (INHALATION) AS NEEDED
Status: CANCELLED | OUTPATIENT
Start: 2024-03-04

## 2024-03-04 RX ORDER — EPINEPHRINE 0.3 MG/.3ML
0.3 INJECTION SUBCUTANEOUS EVERY 5 MIN PRN
OUTPATIENT
Start: 2024-03-04

## 2024-03-04 RX ORDER — DIPHENHYDRAMINE HYDROCHLORIDE 50 MG/ML
50 INJECTION INTRAMUSCULAR; INTRAVENOUS AS NEEDED
OUTPATIENT
Start: 2024-03-04

## 2024-03-04 RX ORDER — FAMOTIDINE 10 MG/ML
20 INJECTION INTRAVENOUS ONCE AS NEEDED
Status: CANCELLED | OUTPATIENT
Start: 2024-03-04

## 2024-03-04 RX ORDER — EPINEPHRINE 0.3 MG/.3ML
0.3 INJECTION SUBCUTANEOUS EVERY 5 MIN PRN
Status: CANCELLED | OUTPATIENT
Start: 2024-03-04

## 2024-03-04 RX ORDER — DIPHENHYDRAMINE HYDROCHLORIDE 50 MG/ML
50 INJECTION INTRAMUSCULAR; INTRAVENOUS AS NEEDED
Status: CANCELLED | OUTPATIENT
Start: 2024-03-04

## 2024-03-04 RX ADMIN — SODIUM CHLORIDE 25 MG: 9 INJECTION, SOLUTION INTRAVENOUS at 10:25

## 2024-03-04 RX ADMIN — SODIUM CHLORIDE 975 MG: 9 INJECTION, SOLUTION INTRAVENOUS at 10:40

## 2024-03-04 ASSESSMENT — ENCOUNTER SYMPTOMS
LOSS OF SENSATION IN FEET: 0
OCCASIONAL FEELINGS OF UNSTEADINESS: 0
DEPRESSION: 0

## 2024-03-04 ASSESSMENT — PAIN SCALES - GENERAL
PAINLEVEL: 0-NO PAIN

## 2024-03-04 NOTE — PROGRESS NOTES
[facility]   Infusion Clinic Note   Date: 2024   Name: Miseal Diaz  : 2000   MRN: [id number]         Reason for Visit: OP Infusion      Accompanied by:Self   Visit Type:: Infusion   Diagnosis: Anemia of mother in pregnancy, delivered with postpartum condition    Nausea and vomiting in pregnancy    Allergies:   Allergies as of 2024    (No Known Allergies)      Current Meds has a current medication list which includes the following prescription(s): diphenhydramine, ferrous sulfate, prenatal no.167-folic acid-dha, and prenatal vit,cyndi 74-iron-folic.        Vitals:  Vitals:    24 1010 24 1025 24 1040 24 1110   BP: 96/63 91/55 90/65 99/64   Pulse: 72 77 72 76   Resp: 12 12   Temp: 36.2 °C (97.2 °F) 36.7 °C (98.1 °F) 36.8 °C (98.2 °F) 36.7 °C (98.1 °F)   SpO2: 100% 99% 100% 100%   Weight:          Infusion Pre-procedure Checklist   Allergies reviewed: yes   Medications reviewed: yes   Contraindications to treatment:No   Previous reaction to current treatment:No   Current Health Issues: None and Pregnancy   Pain: 0-no pain [0]'    Is the pain different from normal: No   Is the pain tolerable: n/a   Is your Doctor aware: n/a   Contraindications based on patient history: No   Provider notified: Yes   Labs: None  Labs reviewed   Fall Risk Screening:         Negative for complaint: [x] all other systems have been reviewed and are negative for complaint   Infusion Readiness:   Assessment Concerns Related to Infusion: No  Provider notified: yes  Assess patient for the concerns below. Document provider notification as appropriate:  - Does not meet criteria to treat N/A  - Has an active or recent infection with/without current antibiotic use No  - Has recent/planned dental work N/A  - Has recent/planned surgeries No  - Has recently received or plans to receive vaccinations No  - Has treatment related toxicities No  - Is pregnant (unless noted  otherwise) Yes    Initiated By: Danica Coronel LPN   Time: 12:46 PM     We administered iron dextran complex (Infed) 25 mg in sodium chloride 0.9% 50 mL IV and iron dextran complex (Infed) 975 mg in sodium chloride 0.9% 500 mL IV.

## 2024-03-06 ENCOUNTER — HOSPITAL ENCOUNTER (OUTPATIENT)
Facility: HOSPITAL | Age: 24
Discharge: HOME | End: 2024-03-06
Attending: OBSTETRICS & GYNECOLOGY | Admitting: OBSTETRICS & GYNECOLOGY
Payer: MEDICAID

## 2024-03-06 VITALS
TEMPERATURE: 97.5 F | SYSTOLIC BLOOD PRESSURE: 110 MMHG | DIASTOLIC BLOOD PRESSURE: 64 MMHG | RESPIRATION RATE: 16 BRPM | OXYGEN SATURATION: 99 % | HEART RATE: 100 BPM

## 2024-03-06 LAB
POC APPEARANCE, URINE: CLEAR
POC BILIRUBIN, URINE: NEGATIVE
POC BLOOD, URINE: ABNORMAL
POC COLOR, URINE: YELLOW
POC GLUCOSE, URINE: NEGATIVE MG/DL
POC KETONES, URINE: NEGATIVE MG/DL
POC LEUKOCYTES, URINE: ABNORMAL
POC NITRITE,URINE: NEGATIVE
POC PH, URINE: 7.5 PH
POC PROTEIN, URINE: NEGATIVE MG/DL
POC SPECIFIC GRAVITY, URINE: 1.01
POC UROBILINOGEN, URINE: 0.2 EU/DL

## 2024-03-06 PROCEDURE — 81002 URINALYSIS NONAUTO W/O SCOPE: CPT

## 2024-03-06 PROCEDURE — 59025 FETAL NON-STRESS TEST: CPT

## 2024-03-06 PROCEDURE — 2500000001 HC RX 250 WO HCPCS SELF ADMINISTERED DRUGS (ALT 637 FOR MEDICARE OP)

## 2024-03-06 PROCEDURE — 99213 OFFICE O/P EST LOW 20 MIN: CPT

## 2024-03-06 PROCEDURE — 99215 OFFICE O/P EST HI 40 MIN: CPT

## 2024-03-06 RX ORDER — ACETAMINOPHEN 325 MG/1
975 TABLET ORAL ONCE
Status: COMPLETED | OUTPATIENT
Start: 2024-03-06 | End: 2024-03-06

## 2024-03-06 RX ORDER — CYCLOBENZAPRINE HCL 10 MG
10 TABLET ORAL ONCE
Status: COMPLETED | OUTPATIENT
Start: 2024-03-06 | End: 2024-03-06

## 2024-03-06 RX ADMIN — CYCLOBENZAPRINE 10 MG: 10 TABLET, FILM COATED ORAL at 16:53

## 2024-03-06 RX ADMIN — ACETAMINOPHEN 975 MG: 325 TABLET ORAL at 16:53

## 2024-03-06 SDOH — SOCIAL STABILITY: SOCIAL INSECURITY: ABUSE SCREEN: ADULT

## 2024-03-06 SDOH — SOCIAL STABILITY: SOCIAL INSECURITY: DOES ANYONE TRY TO KEEP YOU FROM HAVING/CONTACTING OTHER FRIENDS OR DOING THINGS OUTSIDE YOUR HOME?: NO

## 2024-03-06 SDOH — HEALTH STABILITY: MENTAL HEALTH: SUICIDAL BEHAVIOR (LIFETIME): NO

## 2024-03-06 SDOH — HEALTH STABILITY: MENTAL HEALTH: HAVE YOU USED ANY PRESCRIPTION DRUGS OTHER THAN PRESCRIBED IN THE PAST 12 MONTHS?: NO

## 2024-03-06 SDOH — SOCIAL STABILITY: SOCIAL INSECURITY: HAVE YOU HAD THOUGHTS OF HARMING ANYONE ELSE?: NO

## 2024-03-06 SDOH — SOCIAL STABILITY: SOCIAL INSECURITY: HAS ANYONE EVER THREATENED TO HURT YOUR FAMILY OR YOUR PETS?: NO

## 2024-03-06 SDOH — HEALTH STABILITY: MENTAL HEALTH: WERE YOU ABLE TO COMPLETE ALL THE BEHAVIORAL HEALTH SCREENINGS?: YES

## 2024-03-06 SDOH — HEALTH STABILITY: MENTAL HEALTH: HAVE YOU USED ANY SUBSTANCES (CANABIS, COCAINE, HEROIN, HALLUCINOGENS, INHALANTS, ETC.) IN THE PAST 12 MONTHS?: NO

## 2024-03-06 SDOH — ECONOMIC STABILITY: HOUSING INSECURITY: DO YOU FEEL UNSAFE GOING BACK TO THE PLACE WHERE YOU ARE LIVING?: NO

## 2024-03-06 SDOH — SOCIAL STABILITY: SOCIAL INSECURITY: VERBAL ABUSE: DENIES

## 2024-03-06 SDOH — SOCIAL STABILITY: SOCIAL INSECURITY: ARE YOU OR HAVE YOU BEEN THREATENED OR ABUSED PHYSICALLY, EMOTIONALLY, OR SEXUALLY BY ANYONE?: NO

## 2024-03-06 SDOH — SOCIAL STABILITY: SOCIAL INSECURITY: DO YOU FEEL ANYONE HAS EXPLOITED OR TAKEN ADVANTAGE OF YOU FINANCIALLY OR OF YOUR PERSONAL PROPERTY?: NO

## 2024-03-06 SDOH — SOCIAL STABILITY: SOCIAL INSECURITY: ARE THERE ANY APPARENT SIGNS OF INJURIES/BEHAVIORS THAT COULD BE RELATED TO ABUSE/NEGLECT?: NO

## 2024-03-06 SDOH — SOCIAL STABILITY: SOCIAL INSECURITY: PHYSICAL ABUSE: DENIES

## 2024-03-06 SDOH — HEALTH STABILITY: MENTAL HEALTH: WISH TO BE DEAD (PAST 1 MONTH): NO

## 2024-03-06 SDOH — HEALTH STABILITY: MENTAL HEALTH: NON-SPECIFIC ACTIVE SUICIDAL THOUGHTS (PAST 1 MONTH): NO

## 2024-03-06 ASSESSMENT — LIFESTYLE VARIABLES
HOW OFTEN DO YOU HAVE 6 OR MORE DRINKS ON ONE OCCASION: NEVER
AUDIT-C TOTAL SCORE: 0
HOW OFTEN DO YOU HAVE A DRINK CONTAINING ALCOHOL: NEVER
AUDIT-C TOTAL SCORE: 0
HOW MANY STANDARD DRINKS CONTAINING ALCOHOL DO YOU HAVE ON A TYPICAL DAY: PATIENT DOES NOT DRINK
SKIP TO QUESTIONS 9-10: 1

## 2024-03-07 ENCOUNTER — ROUTINE PRENATAL (OUTPATIENT)
Dept: OBSTETRICS AND GYNECOLOGY | Facility: CLINIC | Age: 24
End: 2024-03-07
Payer: MEDICAID

## 2024-03-07 VITALS — BODY MASS INDEX: 19.94 KG/M2 | SYSTOLIC BLOOD PRESSURE: 114 MMHG | DIASTOLIC BLOOD PRESSURE: 60 MMHG | WEIGHT: 109 LBS

## 2024-03-07 DIAGNOSIS — Z3A.35 35 WEEKS GESTATION OF PREGNANCY (HHS-HCC): ICD-10-CM

## 2024-03-07 DIAGNOSIS — Z34.03 ENCOUNTER FOR PRENATAL CARE IN THIRD TRIMESTER OF FIRST PREGNANCY (HHS-HCC): Primary | ICD-10-CM

## 2024-03-07 PROCEDURE — 99214 OFFICE O/P EST MOD 30 MIN: CPT | Performed by: ADVANCED PRACTICE MIDWIFE

## 2024-03-07 NOTE — PROGRESS NOTES
"Assessment/Plan   Problem List Items Addressed This Visit    None  Visit Diagnoses         Codes    Encounter for prenatal care in third trimester of first pregnancy    -  Primary Z34.03    35 weeks gestation of pregnancy     Z3A.35            Discussed routine GBS screening, done yesterday -- pending  Reviewed s/sx of PTL, warning signs, fetal movement counts, and when to call provider  Follow up in 1 week for a routine prenatal visit.    Lynn Juarez, APRN-CNM    Subjective     [facility] JAY Diaz is a 23 y.o.  at 35w2d with a working estimated date of delivery of 2024, by Last Menstrual Period who presents for a routine prenatal visit.     Vaginal bleeding no  Leakage of fluid no  Decreased fetal movements no  Contractions yes - had contractions yesterday and was in triage for 5 hours.  Cervix was 1cm.  Went home and now feels better.  Has some spaced contractions.    Her pregnancy is complicated by:  Pregnancy Problems (from 22 to present)       Problem Noted Resolved    Nausea and vomiting in pregnancy 10/6/2023 by Zoë Huitron No    Priority:  Medium      Pregnancy 10/6/2023 by Zoë Huitron 2023 by Zenaida VACA MD             Objective   Physical Exam:   Weight: 49.4 kg (109 lb)  TW.804 kg (15 lb)  Expected Total Weight Gain: 12.5 kg (27 lb)-18 kg (39 lb)   Pregravid BMI: 17.19  BP: 114/60                  Postpartum Depression: Not on file        Prenatal Labs  Lab Results   Component Value Date    HGB 9.1 (L) 2024    HCT 28.7 (L) 2024     (L) 2024    ABO A 2023    LABRH POS 2023    NEISSGONOAMP NEGATIVE 2023    CHLAMTRACAMP NEGATIVE 2023    SYPHT Nonreactive 2024    HEPBSAG NONREACTIVE 2023    HIV1X2 NONREACTIVE 2023    URINECULTURE No growth 2024     Lab Results   Component Value Date    GLUC1P 111 2024     No results found for: \"GRPBSTREP\"     Education provided   If you experience:     " Contractions that are 5 minutes a part for 2 hours that you cannot walk or talk through   A gush of fluid from your vagina   Bleeding that is heavier than a period   Decrease fetal movements or changes in your baby's movements   A headache that does not go away with Tylenol or rest     During the weekday office hours please call [phone]    After hours please call [phone].  You will tell the answering service if you are planning on giving birth at [facility] or [facility] to be transferred to the correct midwife on call.      DO NOT USE [employer] MESSAGES - THEY ARE NOT MONITORED 24/7

## 2024-03-07 NOTE — H&P
Obstetrical TRIAGE History and Physical     [facility] JAY Diaz is a 23 y.o. . 35w1d d/b Lmp c/w 6wk US    Chief Complaint: No chief complaint on file.    Assessment/Plan    R/o Labor  - Cervix: 0.5/50/-3, unchanged on 2 hr recheck  - Abdominal exam benign   - Wet prep sent  - GBS collected  - Pt continuing to have painful ctx not relieved with position changes, hot packs, tylenol and flexeril however given lack of cervical change offered another recheck vs home with return precautions and patient elects to go home and return if ctx increase in intensity or frequency     IUP at 35w1d   - NST reactive  - Good fetal movement  - Continue routine prenatal care, next appt: 3/7 with Ann     Maternal Well-being  - Vital signs stable and WNL  - Emotional support and reassurance provided  - All questions and concerns addressed     D/w Dr. Gricelda Nunez, PA-C     Active Problems:  There are no active Hospital Problems.      Pregnancy Problems (from 22 to present)       Problem Noted Resolved    Nausea and vomiting in pregnancy 10/6/2023 by Zoë Huitron No    Priority:  Medium      Pregnancy 10/6/2023 by Zoë Huitron 2023 by Zenaida VACA MD    Priority:  Medium            Subjective   [facility] is here complaining of q5 min contractions. Good fetal movement. Denies vaginal bleeding., Denies leaking of fluid.       Obstetrical History   OB History    Para Term  AB Living   2 0 0 0 1 0   SAB IAB Ectopic Multiple Live Births   1 0 0 0 0      # Outcome Date GA Lbr Jack/2nd Weight Sex Delivery Anes PTL Lv   2 Current            1 SAB 23 10w0d              Past Medical History  Past Medical History:   Diagnosis Date    Dermatitis, unspecified 2016    Eczema, unspecified type    Dysmenorrhea 10/06/2023    Encounter for immunization 2016    Immunization due    Encounter for immunization     Immunization due    Failure to thrive (child) 2016    Slow weight  gain, child    Follicular cyst of the skin and subcutaneous tissue, unspecified 04/26/2016    Skin cyst    Other specified health status 08/26/2014    Known health problems: none    Other specified health status 04/26/2016    No pertinent past surgical history        Past Surgical History   Past Surgical History:   Procedure Laterality Date    CT AORTA AND BILATERAL ILIOFEMORAL RUNOFF ANGIOGRAM W AND/OR WO IV CONTRAST  4/26/2022    CT AORTA AND BILATERAL ILIOFEMORAL RUNOFF ANGIOGRAM W AND/OR WO IV CONTRAST 4/26/2022 [facility] EM[facility]GENCY [facility]GAC[facility]    OTHER SURGICAL HISTORY  08/08/2019    No history of surgery       Social History  Social History     Tobacco Use    Smoking status: Never    Smokeless tobacco: Never   Substance Use Topics    Alcohol use: Not Currently     Substance and Sexual Activity   Drug Use Never       Allergies  Patient has no known allergies.     Medications  Medications Prior to Admission   Medication Sig Dispense Refill Last Dose    diphenhydrAMINE (BenadryL) 25 mg capsule Take 2 capsules (50 mg) by mouth every 8 hours if needed for itching (headache in pregnancy) for up to 10 days. 30 capsule 2     ferrous sulfate 325 (65 Fe) MG EC tablet Take 1 tablet by mouth 2 times a day. Do not crush, chew, or split. 60 tablet 3     prenatal no.167-folic acid-dha 400 mcg- 25 mg tablet,chewable Chew 1 tablet once daily. 90 tablet 3     prenatal vit,cyndi 74-iron-folic 27 mg iron- 1 mg tablet Take 1 tablet by mouth once daily. 30 each 11        Objective    Last Vitals  Temp Pulse Resp BP MAP O2 Sat   36.4 °C (97.5 °F) 100 16 110/64   99 %     Physical Examination  GENERAL: Examination reveals a well developed, well nourished, gravid female in no acute distress. She is alert and cooperative.  ABDOMEN: soft, gravid, nontender, nondistended, no abnormal masses, no epigastric pain  FHR is 145 BPM, with Accelerations, and a  cat 1 tracing.    [place] reading:  irregular ctx   CERVIX: Fingertip cm dilated, 50 % effaced, -3  station; MEMBRANES are Intact  PSYCHOLOGICAL: awake and alert; oriented to person, place, and time    Lab Review  Labs in chart were reviewed.

## 2024-03-11 ENCOUNTER — HOSPITAL ENCOUNTER (OUTPATIENT)
Dept: RADIOLOGY | Facility: CLINIC | Age: 24
Discharge: HOME | End: 2024-03-11
Payer: MEDICAID

## 2024-03-11 DIAGNOSIS — Z03.74 ENCOUNTER FOR SUSPECTED PROBLEM WITH FETAL GROWTH RULED OUT: ICD-10-CM

## 2024-03-11 PROCEDURE — 76816 OB US FOLLOW-UP PER FETUS: CPT | Performed by: OBSTETRICS & GYNECOLOGY

## 2024-03-11 PROCEDURE — 76816 OB US FOLLOW-UP PER FETUS: CPT

## 2024-03-14 ENCOUNTER — ROUTINE PRENATAL (OUTPATIENT)
Dept: OBSTETRICS AND GYNECOLOGY | Facility: CLINIC | Age: 24
End: 2024-03-14
Payer: MEDICAID

## 2024-03-14 VITALS — BODY MASS INDEX: 19.75 KG/M2 | DIASTOLIC BLOOD PRESSURE: 58 MMHG | WEIGHT: 108 LBS | SYSTOLIC BLOOD PRESSURE: 104 MMHG

## 2024-03-14 DIAGNOSIS — Z34.03 PREGNANCY TEST POSITIVE FOR NORMAL FIRST PREGNANCY IN THIRD TRIMESTER (HHS-HCC): Primary | ICD-10-CM

## 2024-03-14 DIAGNOSIS — Z3A.36 36 WEEKS GESTATION OF PREGNANCY (HHS-HCC): ICD-10-CM

## 2024-03-14 DIAGNOSIS — F51.01 PRIMARY INSOMNIA: ICD-10-CM

## 2024-03-14 PROCEDURE — 87081 CULTURE SCREEN ONLY: CPT

## 2024-03-14 PROCEDURE — 99214 OFFICE O/P EST MOD 30 MIN: CPT | Performed by: ADVANCED PRACTICE MIDWIFE

## 2024-03-14 NOTE — PROGRESS NOTES
Assessment/Plan   Problem List Items Addressed This Visit    None  Visit Diagnoses         Codes    Pregnancy test positive for normal first pregnancy in third trimester    -  Primary Z34.03    Relevant Orders    Group B Streptococcus (GBS) Prenatal Screen, Culture    36 weeks gestation of pregnancy     Z3A.36    Primary insomnia     F51.01    Relevant Medications    doxylamine (Unisom, doxylamine,) 25 mg tablet              Reviewed s/sx of PTL, warning signs, fetal movement counts, and when to call provider  Follow up in 1 week for a routine prenatal visit.    TARAN Huerta-LEONARD    Subjective     Misael JAY Diaz is a 23 y.o.  at 36w2d with a working estimated date of delivery of 2024, by Last Menstrual Period who presents for a routine prenatal visit.     Had 1 IV iron -- has not been called to [facility] yet.  Task to RN.  Has had irregular contractions that are painful.  She is exhausted.  Tried to get Flexiril filled from pharmacy, but was told insurance denied.  Accepts rx for Unisom.    Reviewed GBS result from triage -- tube was never sent.  [facility] agreed to repeat.    Vaginal bleeding no  Leakage of fluid no  Decreased fetal movements no  Contractions yes - low suspicion for PTL based on cervical exam    Her pregnancy is complicated by:  Pregnancy Problems (from 22 to present)       Problem Noted Resolved    Nausea and vomiting in pregnancy 10/6/2023 by Zoë Huitron No    Priority:  Medium      Pregnancy 10/6/2023 by Zoë Huitron 2023 by Zenaida VACA MD             Objective   Physical Exam:   Weight: 49 kg (108 lb)  TW.35 kg (14 lb)  Expected Total Weight Gain: 12.5 kg (27 lb)-18 kg (39 lb)   Pregravid BMI: 17.19  BP: 104/58                  Postpartum Depression: Not on file        Prenatal Labs  Lab Results   Component Value Date    HGB 9.1 (L) 2024    HCT 28.7 (L) 2024     (L) 2024    ABO A 2023    LABRH POS 2023     "NEISSGONOAMP NEGATIVE 08/01/2023    CHLAMTRACAMP NEGATIVE 08/01/2023    SYPHT Nonreactive 01/04/2024    HEPBSAG NONREACTIVE 08/30/2023    HIV1X2 NONREACTIVE 08/30/2023    URINECULTURE No growth 02/29/2024     Lab Results   Component Value Date    GLUC1P 111 01/04/2024     No results found for: \"GRPBSTREP\"     Education provided  If you experience:     Contractions that are 5 minutes a part for 2 hours that you cannot walk or talk through   A gush of fluid from your vagina   Bleeding that is heavier than a period   Decrease fetal movements or changes in your baby's movements   A headache that does not go away with Tylenol or rest     During the weekday office hours please call [phone]    After hours please call [phone].  You will tell the answering service if you are planning on giving birth at [facility] or [facility] to be transferred to the correct midwife on call.      DO NOT USE [employer] MESSAGES - THEY ARE NOT MONITORED 24/7   "

## 2024-03-17 LAB — GP B STREP GENITAL QL CULT: NORMAL

## 2024-03-21 ENCOUNTER — TELEPHONE (OUTPATIENT)
Dept: OBSTETRICS AND GYNECOLOGY | Facility: CLINIC | Age: 24
End: 2024-03-21

## 2024-03-21 ENCOUNTER — ROUTINE PRENATAL (OUTPATIENT)
Dept: OBSTETRICS AND GYNECOLOGY | Facility: CLINIC | Age: 24
End: 2024-03-21
Payer: MEDICAID

## 2024-03-21 VITALS — DIASTOLIC BLOOD PRESSURE: 70 MMHG | BODY MASS INDEX: 20.49 KG/M2 | WEIGHT: 112 LBS | SYSTOLIC BLOOD PRESSURE: 110 MMHG

## 2024-03-21 DIAGNOSIS — F51.01 PRIMARY INSOMNIA: ICD-10-CM

## 2024-03-21 DIAGNOSIS — Z34.03 ENCOUNTER FOR PRENATAL CARE IN THIRD TRIMESTER OF FIRST PREGNANCY (HHS-HCC): ICD-10-CM

## 2024-03-21 DIAGNOSIS — Z3A.37 37 WEEKS GESTATION OF PREGNANCY (HHS-HCC): ICD-10-CM

## 2024-03-21 PROCEDURE — 82746 ASSAY OF FOLIC ACID SERUM: CPT

## 2024-03-21 PROCEDURE — 85027 COMPLETE CBC AUTOMATED: CPT

## 2024-03-21 PROCEDURE — 82607 VITAMIN B-12: CPT

## 2024-03-21 PROCEDURE — 36415 COLL VENOUS BLD VENIPUNCTURE: CPT

## 2024-03-21 PROCEDURE — 99214 OFFICE O/P EST MOD 30 MIN: CPT | Performed by: ADVANCED PRACTICE MIDWIFE

## 2024-03-21 NOTE — TELEPHONE ENCOUNTER
Called pt to schedule IOL at 39.4wks on 4/6/24 at 1400 at [facility]. Message left with this information and requested call back to confirm. Will send this info via People [employer] Pages.

## 2024-03-21 NOTE — PROGRESS NOTES
Assessment/Plan   Problem List Items Addressed This Visit             ICD-10-CM    Anemia of mother in pregnancy, delivered with postpartum condition - Primary O99.03    Relevant Orders    CBC Anemia Panel With Reflex,Pregnancy     Other Visit Diagnoses         Codes    37 weeks gestation of pregnancy     Z3A.37    Primary insomnia     F51.01            Discussed expectant management vs. scheduling term IOL,    Discussed expectations and methods used for IOL  IOL scheduled -schedule being reviewed and will call with date for 39 week IOL at [facility]    Reviewed s/sx of labor, warning signs, fetal movement counts, and when to call provider  Follow up in 1 week for a routine prenatal visit.       Areli Durand RN    Subjective     Misael JAY Diaz is a 23 y.o.  at 37w2d with a working estimated date of delivery of 2024, by Last Menstrual Period who presents for a routine prenatal visit. Patient states she is miserable, back and hip pain that is persistent. Patient states she tried the Unisom and it was not helpful, still cannot sleep or get comfortable at night.   Father of baby at bedside asking benefits and risk of induction.     Vaginal bleeding no  Leakage of fluid no  Decreased fetal movements no  Contractions no -intermittent contractions 3 in 1 hours rating 8/10    Her pregnancy is complicated by:  Pregnancy Problems (from 22 to present)       Problem Noted Resolved    Nausea and vomiting in pregnancy 10/6/2023 by Zoë Huitron No    Priority:  Medium      Pregnancy 10/6/2023 by Zoë Huitron 2023 by Zenaida VACA MD             Objective   Physical Exam:   Weight: 50.8 kg (112 lb)  TW.165 kg (18 lb)  Expected Total Weight Gain: 12.5 kg (27 lb)-18 kg (39 lb)   Pregravid BMI: 17.19  BP: 110/70                  Postpartum Depression: Not on file        Prenatal Labs  Lab Results   Component Value Date    HGB 9.1 (L) 2024    HCT 28.7 (L) 2024     (L)  02/01/2024    ABO A 08/30/2023    LABRH POS 08/30/2023    NEISSGONOAMP NEGATIVE 08/01/2023    CHLAMTRACAMP NEGATIVE 08/01/2023    SYPHT Nonreactive 01/04/2024    HEPBSAG NONREACTIVE 08/30/2023    HIV1X2 NONREACTIVE 08/30/2023    URINECULTURE No growth 02/29/2024     Lab Results   Component Value Date    GLUC1P 111 01/04/2024     Group B Strep Screen   Date Value Ref Range Status   03/14/2024 No Group B Streptococcus (GBS) isolated  Final        Education provided  If you experience:     Contractions that are 5 minutes a part for 2 hours that you cannot walk or talk through   A gush of fluid from your vagina   Bleeding that is heavier than a period   Decrease fetal movements or changes in your baby's movements   A headache that does not go away with Tylenol or rest     During the weekday office hours please call [phone]    After hours please call [phone].  You will tell the answering service if you are planning on giving birth at [facility] to be transferred to the correct midwife on call.      DO NOT USE [employer] MESSAGES - THEY ARE NOT MONITORED 24/7

## 2024-03-21 NOTE — PROGRESS NOTES
RN requested to schedule 39 wk IOL at [facility] by Lynn Juarez CNM. RN called Kallie Juarez and LM requesting call back for IOL date.

## 2024-03-22 LAB
ERYTHROCYTE [DISTWIDTH] IN BLOOD BY AUTOMATED COUNT: 13.8 % (ref 11.5–14.5)
FOLATE SERPL-MCNC: 6.1 NG/ML
HCT VFR BLD AUTO: 32 % (ref 36–46)
HGB BLD-MCNC: 9.8 G/DL (ref 12–16)
MCH RBC QN AUTO: 32.3 PG (ref 26–34)
MCHC RBC AUTO-ENTMCNC: 30.6 G/DL (ref 32–36)
MCV RBC AUTO: 106 FL (ref 80–100)
NRBC BLD-RTO: 0 /100 WBCS (ref 0–0)
PLATELET # BLD AUTO: 182 X10*3/UL (ref 150–450)
RBC # BLD AUTO: 3.03 X10*6/UL (ref 4–5.2)
REFLEX ADDED, ANEMIA PANEL: NORMAL
VIT B12 SERPL-MCNC: 214 PG/ML
WBC # BLD AUTO: 6.3 X10*3/UL (ref 4.4–11.3)

## 2024-03-27 ENCOUNTER — HOSPITAL ENCOUNTER (OUTPATIENT)
Facility: HOSPITAL | Age: 24
Discharge: HOME | End: 2024-03-28
Attending: STUDENT IN AN ORGANIZED HEALTH CARE EDUCATION/TRAINING PROGRAM | Admitting: STUDENT IN AN ORGANIZED HEALTH CARE EDUCATION/TRAINING PROGRAM
Payer: MEDICAID

## 2024-03-27 RX ORDER — ACETAMINOPHEN 325 MG/1
650 TABLET ORAL ONCE
Status: DISCONTINUED | OUTPATIENT
Start: 2024-03-28 | End: 2024-03-28 | Stop reason: HOSPADM

## 2024-03-27 SDOH — ECONOMIC STABILITY: HOUSING INSECURITY: DO YOU FEEL UNSAFE GOING BACK TO THE PLACE WHERE YOU ARE LIVING?: NO

## 2024-03-27 SDOH — SOCIAL STABILITY: SOCIAL INSECURITY: ARE YOU OR HAVE YOU BEEN THREATENED OR ABUSED PHYSICALLY, EMOTIONALLY, OR SEXUALLY BY ANYONE?: NO

## 2024-03-27 SDOH — SOCIAL STABILITY: SOCIAL INSECURITY: HAVE YOU HAD THOUGHTS OF HARMING ANYONE ELSE?: NO

## 2024-03-27 SDOH — HEALTH STABILITY: MENTAL HEALTH: HAVE YOU USED ANY SUBSTANCES (CANABIS, COCAINE, HEROIN, HALLUCINOGENS, INHALANTS, ETC.) IN THE PAST 12 MONTHS?: NO

## 2024-03-27 SDOH — HEALTH STABILITY: MENTAL HEALTH: WISH TO BE DEAD (PAST 1 MONTH): NO

## 2024-03-27 SDOH — SOCIAL STABILITY: SOCIAL INSECURITY: DOES ANYONE TRY TO KEEP YOU FROM HAVING/CONTACTING OTHER FRIENDS OR DOING THINGS OUTSIDE YOUR HOME?: NO

## 2024-03-27 SDOH — SOCIAL STABILITY: SOCIAL INSECURITY: VERBAL ABUSE: DENIES

## 2024-03-27 SDOH — SOCIAL STABILITY: SOCIAL INSECURITY: HAS ANYONE EVER THREATENED TO HURT YOUR FAMILY OR YOUR PETS?: NO

## 2024-03-27 SDOH — SOCIAL STABILITY: SOCIAL INSECURITY: ARE THERE ANY APPARENT SIGNS OF INJURIES/BEHAVIORS THAT COULD BE RELATED TO ABUSE/NEGLECT?: NO

## 2024-03-27 SDOH — HEALTH STABILITY: MENTAL HEALTH: WERE YOU ABLE TO COMPLETE ALL THE BEHAVIORAL HEALTH SCREENINGS?: YES

## 2024-03-27 SDOH — HEALTH STABILITY: MENTAL HEALTH: NON-SPECIFIC ACTIVE SUICIDAL THOUGHTS (PAST 1 MONTH): NO

## 2024-03-27 SDOH — HEALTH STABILITY: MENTAL HEALTH: HAVE YOU USED ANY PRESCRIPTION DRUGS OTHER THAN PRESCRIBED IN THE PAST 12 MONTHS?: NO

## 2024-03-27 SDOH — SOCIAL STABILITY: SOCIAL INSECURITY: PHYSICAL ABUSE: DENIES

## 2024-03-27 SDOH — SOCIAL STABILITY: SOCIAL INSECURITY: DO YOU FEEL ANYONE HAS EXPLOITED OR TAKEN ADVANTAGE OF YOU FINANCIALLY OR OF YOUR PERSONAL PROPERTY?: NO

## 2024-03-27 SDOH — SOCIAL STABILITY: SOCIAL INSECURITY: ABUSE SCREEN: ADULT

## 2024-03-27 SDOH — HEALTH STABILITY: MENTAL HEALTH: SUICIDAL BEHAVIOR (LIFETIME): NO

## 2024-03-27 ASSESSMENT — LIFESTYLE VARIABLES
SKIP TO QUESTIONS 9-10: 1
HOW OFTEN DO YOU HAVE A DRINK CONTAINING ALCOHOL: NEVER
AUDIT-C TOTAL SCORE: 0
HOW MANY STANDARD DRINKS CONTAINING ALCOHOL DO YOU HAVE ON A TYPICAL DAY: PATIENT DOES NOT DRINK
HOW OFTEN DO YOU HAVE 6 OR MORE DRINKS ON ONE OCCASION: NEVER
AUDIT-C TOTAL SCORE: 0

## 2024-03-27 ASSESSMENT — PAIN SCALES - GENERAL: PAINLEVEL_OUTOF10: 8

## 2024-03-27 ASSESSMENT — PATIENT HEALTH QUESTIONNAIRE - PHQ9
SUM OF ALL RESPONSES TO PHQ9 QUESTIONS 1 & 2: 0
2. FEELING DOWN, DEPRESSED OR HOPELESS: NOT AT ALL
1. LITTLE INTEREST OR PLEASURE IN DOING THINGS: NOT AT ALL

## 2024-03-28 ENCOUNTER — APPOINTMENT (OUTPATIENT)
Dept: OBSTETRICS AND GYNECOLOGY | Facility: CLINIC | Age: 24
End: 2024-03-28
Payer: MEDICAID

## 2024-03-28 VITALS
BODY MASS INDEX: 20.53 KG/M2 | HEIGHT: 62 IN | OXYGEN SATURATION: 98 % | WEIGHT: 111.55 LBS | SYSTOLIC BLOOD PRESSURE: 120 MMHG | TEMPERATURE: 97.9 F | HEART RATE: 101 BPM | RESPIRATION RATE: 16 BRPM | DIASTOLIC BLOOD PRESSURE: 64 MMHG

## 2024-03-28 PROCEDURE — 99213 OFFICE O/P EST LOW 20 MIN: CPT | Performed by: ADVANCED PRACTICE MIDWIFE

## 2024-03-28 PROCEDURE — 99214 OFFICE O/P EST MOD 30 MIN: CPT

## 2024-03-28 NOTE — H&P
Obstetrical Admission History and Physical    24 y/o  @ 38.2 presented to triage with ctx; unsure how frequent they are, thinks approx q10-20 min. Reports normal fetal movement, denies lof and vaginal bleeding. Was 1.5 cm in office.    Obstetrical History   OB History    Para Term  AB Living   2 0 0 0 1 0   SAB IAB Ectopic Multiple Live Births   1 0 0 0 0      # Outcome Date GA Lbr Jack/2nd Weight Sex Delivery Anes PTL Lv   2 Current            1 SAB 23 10w0d              Past Medical History  Past Medical History:   Diagnosis Date    Dermatitis, unspecified 2016    Eczema, unspecified type    Dysmenorrhea 10/06/2023    Encounter for immunization 2016    Immunization due    Encounter for immunization     Immunization due    Failure to thrive (child) 2016    Slow weight gain, child    Follicular cyst of the skin and subcutaneous tissue, unspecified 2016    Skin cyst    Other specified health status 2014    Known health problems: none    Other specified health status 2016    No pertinent past surgical history        Past Surgical History   Past Surgical History:   Procedure Laterality Date    CT AORTA AND BILATERAL ILIOFEMORAL RUNOFF ANGIOGRAM W AND/OR WO IV CONTRAST  2022    CT AORTA AND BILATERAL ILIOFEMORAL RUNOFF ANGIOGRAM W AND/OR WO IV CONTRAST 2022 [facility]    OTHER SURGICAL HISTORY  2019    No history of surgery       Social History  Social History     Tobacco Use    Smoking status: Never    Smokeless tobacco: Never   Substance Use Topics    Alcohol use: Not Currently     Substance and Sexual Activity   Drug Use Never       Allergies  Patient has no known allergies.     Medications  Medications Prior to Admission   Medication Sig Dispense Refill Last Dose    doxylamine (Unisom, doxylamine,) 25 mg tablet Take 1 tablet (25 mg) by mouth as needed at bedtime for sleep or nausea. 30 tablet 3 Unknown    ferrous sulfate 325 (65  Fe) MG EC tablet Take 1 tablet by mouth 2 times a day. Do not crush, chew, or split. 60 tablet 3 Unknown    prenatal no.167-folic acid-dha 400 mcg- 25 mg tablet,chewable Chew 1 tablet once daily. 90 tablet 3 Past Week    prenatal vit,cyndi 74-iron-folic 27 mg iron- 1 mg tablet Take 1 tablet by mouth once daily. 30 each 11        Objective    Last Vitals  Temp Pulse Resp BP MAP O2 Sat   36.6 °C (97.9 °F) 101 18 120/64   98 %     Physical Examination  GENERAL: Examination reveals a well developed, well nourished, gravid female in no acute distress. She is alert and cooperative and appears comfortable.  FHR is 130, mod variability, +accels, no decels.  [place] reading: q5-7 min.  CERVIX: 1.5 cm dilated, 50 % effaced, -3 station; MEMBRANES are Intact  PSYCHOLOGICAL: awake and alert; oriented to person, place, and time    A: 22 y/o  @ 38.2  False labor  Reactive NST    P: Discharge home w/precautions  Follow up with scheduled prenatal appt today or as needed    Nina Collado CNM

## 2024-03-29 ENCOUNTER — TELEPHONE (OUTPATIENT)
Dept: OBSTETRICS AND GYNECOLOGY | Facility: CLINIC | Age: 24
End: 2024-03-29
Payer: MEDICAID

## 2024-03-29 NOTE — TELEPHONE ENCOUNTER
Name/ verified  RN received call from Kallie Boyd in scheduling that pt IOL has to be moved from 6 to /5 at 8pm at same location. Pt notified that IOL was moved due to medically indicated reason. Pt understood and denies any questions at this time.

## 2024-03-30 ENCOUNTER — HOSPITAL ENCOUNTER (INPATIENT)
Facility: HOSPITAL | Age: 24
LOS: 2 days | Discharge: HOME | End: 2024-04-01
Attending: STUDENT IN AN ORGANIZED HEALTH CARE EDUCATION/TRAINING PROGRAM
Payer: MEDICAID

## 2024-03-30 ENCOUNTER — ANESTHESIA EVENT (OUTPATIENT)
Dept: EMERGENCY MEDICINE | Facility: HOSPITAL | Age: 24
End: 2024-03-30
Payer: MEDICAID

## 2024-03-30 ENCOUNTER — ANESTHESIA (OUTPATIENT)
Dept: EMERGENCY MEDICINE | Facility: HOSPITAL | Age: 24
End: 2024-03-30
Payer: MEDICAID

## 2024-03-30 ENCOUNTER — ANESTHESIA EVENT (OUTPATIENT)
Dept: OBSTETRICS AND GYNECOLOGY | Facility: HOSPITAL | Age: 24
End: 2024-03-30
Payer: MEDICAID

## 2024-03-30 ENCOUNTER — HOSPITAL ENCOUNTER (EMERGENCY)
Facility: HOSPITAL | Age: 24
End: 2024-03-30
Payer: MEDICAID

## 2024-03-30 ENCOUNTER — ANESTHESIA (OUTPATIENT)
Dept: OBSTETRICS AND GYNECOLOGY | Facility: HOSPITAL | Age: 24
End: 2024-03-30
Payer: MEDICAID

## 2024-03-30 VITALS
OXYGEN SATURATION: 99 % | TEMPERATURE: 98.5 F | SYSTOLIC BLOOD PRESSURE: 131 MMHG | RESPIRATION RATE: 18 BRPM | DIASTOLIC BLOOD PRESSURE: 76 MMHG | HEART RATE: 85 BPM

## 2024-03-30 PROBLEM — O36.8390 NON-REASSURING ELECTRONIC FETAL MONITORING TRACING (HHS-HCC): Status: ACTIVE | Noted: 2024-03-30

## 2024-03-30 LAB
ABO GROUP (TYPE) IN BLOOD: NORMAL
ANTIBODY SCREEN: NORMAL
ERYTHROCYTE [DISTWIDTH] IN BLOOD BY AUTOMATED COUNT: 13.2 % (ref 11.5–14.5)
HCT VFR BLD AUTO: 39.6 % (ref 36–46)
HGB BLD-MCNC: 12.2 G/DL (ref 12–16)
MCH RBC QN AUTO: 31.2 PG (ref 26–34)
MCHC RBC AUTO-ENTMCNC: 30.8 G/DL (ref 32–36)
MCV RBC AUTO: 101 FL (ref 80–100)
NRBC BLD-RTO: 0 /100 WBCS (ref 0–0)
PLATELET # BLD AUTO: 209 X10*3/UL (ref 150–450)
RBC # BLD AUTO: 3.91 X10*6/UL (ref 4–5.2)
RH FACTOR (ANTIGEN D): NORMAL
TREPONEMA PALLIDUM IGG+IGM AB [PRESENCE] IN SERUM OR PLASMA BY IMMUNOASSAY: NONREACTIVE
WBC # BLD AUTO: 6.2 X10*3/UL (ref 4.4–11.3)

## 2024-03-30 PROCEDURE — 99214 OFFICE O/P EST MOD 30 MIN: CPT

## 2024-03-30 PROCEDURE — 59025 FETAL NON-STRESS TEST: CPT

## 2024-03-30 PROCEDURE — 0KQM0ZZ REPAIR PERINEUM MUSCLE, OPEN APPROACH: ICD-10-PCS

## 2024-03-30 PROCEDURE — 86901 BLOOD TYPING SEROLOGIC RH(D): CPT

## 2024-03-30 PROCEDURE — 2500000004 HC RX 250 GENERAL PHARMACY W/ HCPCS (ALT 636 FOR OP/ED)

## 2024-03-30 PROCEDURE — 85027 COMPLETE CBC AUTOMATED: CPT

## 2024-03-30 PROCEDURE — 7210000002 HC LABOR PER HOUR

## 2024-03-30 PROCEDURE — 59409 OBSTETRICAL CARE: CPT

## 2024-03-30 PROCEDURE — 2500000004 HC RX 250 GENERAL PHARMACY W/ HCPCS (ALT 636 FOR OP/ED): Mod: SE | Performed by: STUDENT IN AN ORGANIZED HEALTH CARE EDUCATION/TRAINING PROGRAM

## 2024-03-30 PROCEDURE — 1120000001 HC OB PRIVATE ROOM DAILY

## 2024-03-30 PROCEDURE — 2500000004 HC RX 250 GENERAL PHARMACY W/ HCPCS (ALT 636 FOR OP/ED): Performed by: NURSE PRACTITIONER

## 2024-03-30 PROCEDURE — 36415 COLL VENOUS BLD VENIPUNCTURE: CPT

## 2024-03-30 PROCEDURE — 10907ZC DRAINAGE OF AMNIOTIC FLUID, THERAPEUTIC FROM PRODUCTS OF CONCEPTION, VIA NATURAL OR ARTIFICIAL OPENING: ICD-10-PCS | Performed by: NURSE PRACTITIONER

## 2024-03-30 PROCEDURE — 51701 INSERT BLADDER CATHETER: CPT

## 2024-03-30 PROCEDURE — 1100000001 HC PRIVATE ROOM DAILY

## 2024-03-30 PROCEDURE — 2500000005 HC RX 250 GENERAL PHARMACY W/O HCPCS: Mod: SE | Performed by: STUDENT IN AN ORGANIZED HEALTH CARE EDUCATION/TRAINING PROGRAM

## 2024-03-30 PROCEDURE — 86920 COMPATIBILITY TEST SPIN: CPT

## 2024-03-30 PROCEDURE — 86780 TREPONEMA PALLIDUM: CPT

## 2024-03-30 RX ORDER — OXYTOCIN 10 [USP'U]/ML
10 INJECTION, SOLUTION INTRAMUSCULAR; INTRAVENOUS ONCE AS NEEDED
Status: DISCONTINUED | OUTPATIENT
Start: 2024-03-30 | End: 2024-03-30 | Stop reason: HOSPADM

## 2024-03-30 RX ORDER — LIDOCAINE HYDROCHLORIDE 10 MG/ML
30 INJECTION INFILTRATION; PERINEURAL ONCE AS NEEDED
Status: DISCONTINUED | OUTPATIENT
Start: 2024-03-30 | End: 2024-03-30 | Stop reason: HOSPADM

## 2024-03-30 RX ORDER — IBUPROFEN 600 MG/1
600 TABLET ORAL EVERY 6 HOURS
Status: DISCONTINUED | OUTPATIENT
Start: 2024-03-30 | End: 2024-04-01 | Stop reason: HOSPADM

## 2024-03-30 RX ORDER — MORPHINE SULFATE 2 MG/ML
2 INJECTION, SOLUTION INTRAMUSCULAR; INTRAVENOUS EVERY 2 HOUR PRN
Status: DISCONTINUED | OUTPATIENT
Start: 2024-03-30 | End: 2024-03-31

## 2024-03-30 RX ORDER — LABETALOL HYDROCHLORIDE 5 MG/ML
20 INJECTION, SOLUTION INTRAVENOUS ONCE AS NEEDED
Status: DISCONTINUED | OUTPATIENT
Start: 2024-03-30 | End: 2024-03-30 | Stop reason: HOSPADM

## 2024-03-30 RX ORDER — LIDOCAINE HYDROCHLORIDE AND EPINEPHRINE 15; 5 MG/ML; UG/ML
INJECTION, SOLUTION EPIDURAL AS NEEDED
Status: DISCONTINUED | OUTPATIENT
Start: 2024-03-30 | End: 2024-03-31

## 2024-03-30 RX ORDER — ONDANSETRON HYDROCHLORIDE 2 MG/ML
4 INJECTION, SOLUTION INTRAVENOUS EVERY 6 HOURS PRN
Status: DISCONTINUED | OUTPATIENT
Start: 2024-03-30 | End: 2024-03-31

## 2024-03-30 RX ORDER — MISOPROSTOL 200 UG/1
800 TABLET ORAL ONCE AS NEEDED
Status: DISCONTINUED | OUTPATIENT
Start: 2024-03-30 | End: 2024-03-30 | Stop reason: HOSPADM

## 2024-03-30 RX ORDER — METOCLOPRAMIDE HYDROCHLORIDE 5 MG/ML
10 INJECTION INTRAMUSCULAR; INTRAVENOUS EVERY 6 HOURS PRN
Status: DISCONTINUED | OUTPATIENT
Start: 2024-03-30 | End: 2024-03-31

## 2024-03-30 RX ORDER — METHYLERGONOVINE MALEATE 0.2 MG/ML
0.2 INJECTION INTRAVENOUS ONCE AS NEEDED
Status: COMPLETED | OUTPATIENT
Start: 2024-03-30 | End: 2024-03-30

## 2024-03-30 RX ORDER — FENTANYL/BUPIVACAINE/NS/PF 2MCG/ML-.1
PLASTIC BAG, INJECTION (ML) INJECTION CONTINUOUS PRN
Status: DISCONTINUED | OUTPATIENT
Start: 2024-03-30 | End: 2024-03-31

## 2024-03-30 RX ORDER — CARBOPROST TROMETHAMINE 250 UG/ML
250 INJECTION, SOLUTION INTRAMUSCULAR ONCE AS NEEDED
Status: DISCONTINUED | OUTPATIENT
Start: 2024-03-30 | End: 2024-03-30 | Stop reason: HOSPADM

## 2024-03-30 RX ORDER — FENTANYL CITRATE 50 UG/ML
INJECTION, SOLUTION INTRAMUSCULAR; INTRAVENOUS AS NEEDED
Status: DISCONTINUED | OUTPATIENT
Start: 2024-03-30 | End: 2024-03-31

## 2024-03-30 RX ORDER — HYDRALAZINE HYDROCHLORIDE 20 MG/ML
5 INJECTION INTRAMUSCULAR; INTRAVENOUS ONCE AS NEEDED
Status: DISCONTINUED | OUTPATIENT
Start: 2024-03-30 | End: 2024-03-30 | Stop reason: HOSPADM

## 2024-03-30 RX ORDER — FENTANYL/BUPIVACAINE/NS/PF 2MCG/ML-.1
0-54 PLASTIC BAG, INJECTION (ML) INJECTION CONTINUOUS
Status: CANCELLED | OUTPATIENT
Start: 2024-03-30

## 2024-03-30 RX ORDER — TERBUTALINE SULFATE 1 MG/ML
0.25 INJECTION SUBCUTANEOUS ONCE AS NEEDED
Status: DISCONTINUED | OUTPATIENT
Start: 2024-03-30 | End: 2024-03-30 | Stop reason: HOSPADM

## 2024-03-30 RX ORDER — ACETAMINOPHEN 325 MG/1
975 TABLET ORAL EVERY 6 HOURS
Status: DISCONTINUED | OUTPATIENT
Start: 2024-03-30 | End: 2024-04-01 | Stop reason: HOSPADM

## 2024-03-30 RX ORDER — FENTANYL/BUPIVACAINE/NS/PF 2MCG/ML-.1
PLASTIC BAG, INJECTION (ML) INJECTION AS NEEDED
Status: DISCONTINUED | OUTPATIENT
Start: 2024-03-30 | End: 2024-03-31

## 2024-03-30 RX ORDER — LOPERAMIDE HYDROCHLORIDE 2 MG/1
4 CAPSULE ORAL EVERY 2 HOUR PRN
Status: DISCONTINUED | OUTPATIENT
Start: 2024-03-30 | End: 2024-03-30 | Stop reason: HOSPADM

## 2024-03-30 RX ORDER — METOCLOPRAMIDE 10 MG/1
10 TABLET ORAL EVERY 6 HOURS PRN
Status: DISCONTINUED | OUTPATIENT
Start: 2024-03-30 | End: 2024-03-31

## 2024-03-30 RX ORDER — TRANEXAMIC ACID 100 MG/ML
1000 INJECTION, SOLUTION INTRAVENOUS ONCE AS NEEDED
Status: DISCONTINUED | OUTPATIENT
Start: 2024-03-30 | End: 2024-03-30 | Stop reason: HOSPADM

## 2024-03-30 RX ORDER — NIFEDIPINE 10 MG/1
10 CAPSULE ORAL ONCE AS NEEDED
Status: DISCONTINUED | OUTPATIENT
Start: 2024-03-30 | End: 2024-03-30 | Stop reason: HOSPADM

## 2024-03-30 RX ORDER — SODIUM CHLORIDE, SODIUM LACTATE, POTASSIUM CHLORIDE, CALCIUM CHLORIDE 600; 310; 30; 20 MG/100ML; MG/100ML; MG/100ML; MG/100ML
125 INJECTION, SOLUTION INTRAVENOUS CONTINUOUS
Status: DISCONTINUED | OUTPATIENT
Start: 2024-03-30 | End: 2024-03-31

## 2024-03-30 RX ORDER — ONDANSETRON 4 MG/1
4 TABLET, FILM COATED ORAL EVERY 6 HOURS PRN
Status: DISCONTINUED | OUTPATIENT
Start: 2024-03-30 | End: 2024-03-31

## 2024-03-30 RX ORDER — OXYTOCIN/0.9 % SODIUM CHLORIDE 30/500 ML
2-30 PLASTIC BAG, INJECTION (ML) INTRAVENOUS CONTINUOUS
Status: DISCONTINUED | OUTPATIENT
Start: 2024-03-30 | End: 2024-03-31

## 2024-03-30 RX ORDER — OXYTOCIN/0.9 % SODIUM CHLORIDE 30/500 ML
60 PLASTIC BAG, INJECTION (ML) INTRAVENOUS ONCE AS NEEDED
Status: DISCONTINUED | OUTPATIENT
Start: 2024-03-30 | End: 2024-03-30 | Stop reason: HOSPADM

## 2024-03-30 RX ADMIN — Medication 14 ML/HR: at 19:41

## 2024-03-30 RX ADMIN — Medication 5 ML: at 10:08

## 2024-03-30 RX ADMIN — FENTANYL CITRATE 100 MCG: 50 INJECTION, SOLUTION INTRAMUSCULAR; INTRAVENOUS at 19:43

## 2024-03-30 RX ADMIN — Medication 5 ML: at 10:04

## 2024-03-30 RX ADMIN — SODIUM CHLORIDE, POTASSIUM CHLORIDE, SODIUM LACTATE AND CALCIUM CHLORIDE 125 ML/HR: 600; 310; 30; 20 INJECTION, SOLUTION INTRAVENOUS at 03:13

## 2024-03-30 RX ADMIN — SODIUM CHLORIDE, POTASSIUM CHLORIDE, SODIUM LACTATE AND CALCIUM CHLORIDE 500 ML: 600; 310; 30; 20 INJECTION, SOLUTION INTRAVENOUS at 06:59

## 2024-03-30 RX ADMIN — ONDANSETRON 4 MG: 2 INJECTION INTRAMUSCULAR; INTRAVENOUS at 22:48

## 2024-03-30 RX ADMIN — SODIUM CHLORIDE, POTASSIUM CHLORIDE, SODIUM LACTATE AND CALCIUM CHLORIDE 500 ML: 600; 310; 30; 20 INJECTION, SOLUTION INTRAVENOUS at 09:31

## 2024-03-30 RX ADMIN — Medication 2 MILLI-UNITS/MIN: at 03:45

## 2024-03-30 RX ADMIN — SODIUM CHLORIDE, POTASSIUM CHLORIDE, SODIUM LACTATE AND CALCIUM CHLORIDE 125 ML/HR: 600; 310; 30; 20 INJECTION, SOLUTION INTRAVENOUS at 13:49

## 2024-03-30 RX ADMIN — MORPHINE SULFATE 2 MG: 2 INJECTION, SOLUTION INTRAMUSCULAR; INTRAVENOUS at 08:25

## 2024-03-30 RX ADMIN — LIDOCAINE HYDROCHLORIDE AND EPINEPHRINE 3 ML: 15; 5 INJECTION, SOLUTION EPIDURAL at 09:59

## 2024-03-30 RX ADMIN — Medication 14 ML/HR: at 10:11

## 2024-03-30 RX ADMIN — METHYLERGONOVINE MALEATE 0.2 MG: 0.2 INJECTION, SOLUTION INTRAMUSCULAR; INTRAVENOUS at 20:41

## 2024-03-30 RX ADMIN — SODIUM CHLORIDE, POTASSIUM CHLORIDE, SODIUM LACTATE AND CALCIUM CHLORIDE 500 ML: 600; 310; 30; 20 INJECTION, SOLUTION INTRAVENOUS at 16:50

## 2024-03-30 SDOH — HEALTH STABILITY: MENTAL HEALTH: CURRENT SMOKER: 0

## 2024-03-30 SDOH — HEALTH STABILITY: MENTAL HEALTH: WERE YOU ABLE TO COMPLETE ALL THE BEHAVIORAL HEALTH SCREENINGS?: YES

## 2024-03-30 SDOH — ECONOMIC STABILITY: INCOME INSECURITY: HOW HARD IS IT FOR YOU TO PAY FOR THE VERY BASICS LIKE FOOD, HOUSING, MEDICAL CARE, AND HEATING?: NOT VERY HARD

## 2024-03-30 SDOH — SOCIAL STABILITY: SOCIAL INSECURITY: DO YOU FEEL ANYONE HAS EXPLOITED OR TAKEN ADVANTAGE OF YOU FINANCIALLY OR OF YOUR PERSONAL PROPERTY?: NO

## 2024-03-30 SDOH — HEALTH STABILITY: MENTAL HEALTH: NON-SPECIFIC ACTIVE SUICIDAL THOUGHTS (PAST 1 MONTH): NO

## 2024-03-30 SDOH — ECONOMIC STABILITY: FOOD INSECURITY: WITHIN THE PAST 12 MONTHS, THE FOOD YOU BOUGHT JUST DIDN'T LAST AND YOU DIDN'T HAVE MONEY TO GET MORE.: PATIENT DECLINED

## 2024-03-30 SDOH — HEALTH STABILITY: MENTAL HEALTH: HOW OFTEN DO YOU HAVE 6 OR MORE DRINKS ON ONE OCCASION?: NEVER

## 2024-03-30 SDOH — ECONOMIC STABILITY: TRANSPORTATION INSECURITY
IN THE PAST 12 MONTHS, HAS THE LACK OF TRANSPORTATION KEPT YOU FROM MEDICAL APPOINTMENTS OR FROM GETTING MEDICATIONS?: NO

## 2024-03-30 SDOH — HEALTH STABILITY: MENTAL HEALTH: HOW OFTEN DO YOU HAVE A DRINK CONTAINING ALCOHOL?: NEVER

## 2024-03-30 SDOH — HEALTH STABILITY: MENTAL HEALTH: WISH TO BE DEAD (PAST 1 MONTH): NO

## 2024-03-30 SDOH — ECONOMIC STABILITY: TRANSPORTATION INSECURITY
IN THE PAST 12 MONTHS, HAS LACK OF TRANSPORTATION KEPT YOU FROM MEETINGS, WORK, OR FROM GETTING THINGS NEEDED FOR DAILY LIVING?: NO

## 2024-03-30 SDOH — SOCIAL STABILITY: SOCIAL INSECURITY: HAS ANYONE EVER THREATENED TO HURT YOUR FAMILY OR YOUR PETS?: NO

## 2024-03-30 SDOH — SOCIAL STABILITY: SOCIAL INSECURITY: DOES ANYONE TRY TO KEEP YOU FROM HAVING/CONTACTING OTHER FRIENDS OR DOING THINGS OUTSIDE YOUR HOME?: NO

## 2024-03-30 SDOH — ECONOMIC STABILITY: HOUSING INSECURITY: DO YOU FEEL UNSAFE GOING BACK TO THE PLACE WHERE YOU ARE LIVING?: NO

## 2024-03-30 SDOH — SOCIAL STABILITY: SOCIAL INSECURITY: ABUSE SCREEN: ADULT

## 2024-03-30 SDOH — SOCIAL STABILITY: SOCIAL INSECURITY: ARE THERE ANY APPARENT SIGNS OF INJURIES/BEHAVIORS THAT COULD BE RELATED TO ABUSE/NEGLECT?: NO

## 2024-03-30 SDOH — SOCIAL STABILITY: SOCIAL INSECURITY: HAVE YOU HAD THOUGHTS OF HARMING ANYONE ELSE?: NO

## 2024-03-30 SDOH — SOCIAL STABILITY: SOCIAL INSECURITY: ARE YOU OR HAVE YOU BEEN THREATENED OR ABUSED PHYSICALLY, EMOTIONALLY, OR SEXUALLY BY ANYONE?: NO

## 2024-03-30 SDOH — SOCIAL STABILITY: SOCIAL INSECURITY: PHYSICAL ABUSE: UNABLE TO ASSESS

## 2024-03-30 SDOH — ECONOMIC STABILITY: FOOD INSECURITY: WITHIN THE PAST 12 MONTHS, YOU WORRIED THAT YOUR FOOD WOULD RUN OUT BEFORE YOU GOT MONEY TO BUY MORE.: PATIENT DECLINED

## 2024-03-30 SDOH — SOCIAL STABILITY: SOCIAL INSECURITY: VERBAL ABUSE: UNABLE TO ASSESS

## 2024-03-30 SDOH — HEALTH STABILITY: MENTAL HEALTH: HOW MANY STANDARD DRINKS CONTAINING ALCOHOL DO YOU HAVE ON A TYPICAL DAY?: PATIENT DOES NOT DRINK

## 2024-03-30 SDOH — HEALTH STABILITY: MENTAL HEALTH: SUICIDAL BEHAVIOR (LIFETIME): NO

## 2024-03-30 ASSESSMENT — LIFESTYLE VARIABLES
AUDIT-C TOTAL SCORE: 0
AUDIT-C TOTAL SCORE: 0
HOW OFTEN DO YOU HAVE A DRINK CONTAINING ALCOHOL: NEVER
HOW MANY STANDARD DRINKS CONTAINING ALCOHOL DO YOU HAVE ON A TYPICAL DAY: PATIENT DOES NOT DRINK
SKIP TO QUESTIONS 9-10: 1
AUDIT-C TOTAL SCORE: 0
HOW OFTEN DO YOU HAVE 6 OR MORE DRINKS ON ONE OCCASION: NEVER
SKIP TO QUESTIONS 9-10: 1

## 2024-03-30 ASSESSMENT — PAIN SCALES - GENERAL
PAINLEVEL_OUTOF10: 5 - MODERATE PAIN
PAINLEVEL_OUTOF10: 0 - NO PAIN
PAINLEVEL_OUTOF10: 0 - NO PAIN
PAINLEVEL_OUTOF10: 5 - MODERATE PAIN
PAINLEVEL_OUTOF10: 1
PAINLEVEL_OUTOF10: 5 - MODERATE PAIN
PAINLEVEL_OUTOF10: 8
PAINLEVEL_OUTOF10: 0 - NO PAIN

## 2024-03-30 NOTE — PROGRESS NOTES
Assessment    23 y.o.  at 38w4d  FHT Category 1  Latent labor  GBS neg  Plan    Will plan for AROM if appropriate with next exam once CRB out  Encourage frequent position changes as tolerated  Continue pitocin per protocol  Continue assessment of maternal and fetal wellbeing  Recheck as clinically indicated by maternal or fetal status  Anticipate active phase of labor    Laurita Alvarez, APRN-CNM, APRN-CNP    Subjective:  Misael Diaz is comfortable now with epidural.     Objective:  Fetal Monitoring      Baseline FHR: 140 per minute  Variability: moderate  Accelerations: no  Decelerations: none  TOCO: regular, every 2-3 minutes    Cervical Exam:  deferred, CRB in place    Membrane status: intact    Pitocin is at 4 mu/min.    Vitals:    24 1043 24 1046 24 1048 24 1049   BP: 114/67 112/71  105/59   Pulse: 90 89 100 83   Resp:       Temp:       TempSrc:       SpO2: 100%  100%    Weight:       Height:

## 2024-03-30 NOTE — PROGRESS NOTES
Assessment    23 y.o.  at 38w4d  FHT Category 1 (short period of Cat II tracing in setting of maternal hypotension, overall reassuring for moderate variability)  Latent labor  GBS neg  Plan    AROM'd for moderate amount of slightly blood tinged fluid  Encourage frequent position changes as tolerated  Continue pitocin per protocol  Continue assessment of maternal and fetal wellbeing  Recheck as clinically indicated by maternal or fetal status  Anticipate active phase of labor    Laurita Alvarez, APRN-CNM, APRN-CNP    Subjective:  Misael Diaz is reporting intermittent rectal pressure. CRB remains in place.     Objective:  Fetal Monitoring      Baseline FHR: 140 per minute  Variability: moderate  Accelerations: no  Decelerations:  occasional late decel noted in setting of maternal hypotension, resolved once BP stable  TOCO: regular, every 3 minutes    Cervical Exam:  4 cm dilated, 80 effaced, -3 station, CRB not present at internal os, appears to be higher above fetal head; CRB deflated and removed    Membrane status: ruptured, AROM'd at 1407 for moderate amount of slightly blood tinged fluid    Pitocin is at 4 mu/min.    Vitals:    24 1359 24 1405 24 1411 24 1414   BP: 112/63 108/61 97/55 105/60   Pulse: 85 87 91 83   Resp:       Temp:       TempSrc:       SpO2:       Weight:       Height:

## 2024-03-30 NOTE — CARE PLAN
"The patient's goals for the shift include \"To get the baby out.\"  Patient's goal not met, but progressing     The clinical goals for the shift include Reasuring FHT's throughout IOL process. Goal met; overall FHR tracing was category 1 ; 2 episodes of late decels each x 1 occurrence resolved in response to interventions.    Problem: Vaginal Birth or  Section  Goal: Fetal and maternal status remain reassuring during the birth process  Outcome: Progressing  Goal: Tolerate CRB for IOL placement maintenance until dislodgement/removal 12hrs after placement  Outcome: Met  Goal: Prevention of malpresentation/labor dystocia through delivery  Outcome: Progressing  Goal: Demonstrates labor coping techniques through delivery  Outcome: Progressing  Goal: Minimal s/sx of HDP and BP<160/110  Outcome: Progressing     Problem: Pain - Adult  Goal: Verbalizes/displays adequate comfort level or baseline comfort level  Outcome: Progressing     Problem: Safety - Adult  Goal: Free from fall injury  Outcome: Progressing     Problem: Fall/Injury  Goal: Not fall by end of shift  Outcome: Met  Goal: Be free from injury by end of the shift  Outcome: Met  22 y/o  at 38.4 wks undergoing IOL due to NRFHT noted while in triage during previous shift. Labor progressing in response to Pitocin and CRB which was removed followed by AROM . Stable fetal response to ROM . Pt initially desired NCB, but due to sleep deprivation opted for epidural anesthesia after Morphine IVP X 1 found to be ineffective for enabling adequate rest/relaxation. Epidural functioning effectively for pan mgt. Labor course notable for 2 episodes of mild hypotension with 2nd episode treated with IVF resuscitation per anesthesia with BP improvement.   "

## 2024-03-30 NOTE — ANESTHESIA PROCEDURE NOTES
Epidural Block    Patient location during procedure: OB  Start time: 3/30/2024 9:50 AM  End time: 3/30/2024 10:14 AM  Reason for block: labor analgesia  Staffing  Performed: resident   Authorized by: Marie Osborne MD    Performed by: Veronique Lama MD    Preanesthetic Checklist  Completed: patient identified, IV checked, risks and benefits discussed, surgical consent, monitors and equipment checked, pre-op evaluation, timeout performed and sterile techniques followed  Block Timeout  RN/Licensed healthcare professional reads aloud to the Anesthesia provider and entire team: Patient identity, procedure with side and site, patient position, and as applicable the availability of implants/special equipment/special requirements.  Patient on coagulant treatment: no  Timeout performed at: 3/30/2024 9:50 AM  Block Placement  Patient position: sitting  Prep: ChloraPrep  Sterility prep: cap, drape, gloves and mask  Sedation level: no sedation  Patient monitoring: blood pressure, continuous pulse oximetry and heart rate  Approach: midline  Local numbing: lidocaine 1% to skin and subcutaneous tissues  Vertebral space: lumbar  Lumbar location: L3-L4  Epidural  Loss of resistance technique: saline  Guidance: landmark technique        Needle  Needle type: Tuohy   Needle gauge: 17  Needle insertion depth: 4.5 cm  Catheter type: multi-orifice  Catheter size: 19 G  Catheter at skin depth: 9.5 cm  Catheter securement method: clear occlusive dressing and liquid medical adhesive    Test dose: lidocaine 1.5% with epinephrine 1-to-200,000  Test dose: lidocaine 1.5% with epinephrine 1-to-200,000  Test dose result: no positive test dose    PCEA  Medication concentration used: 0.044% Bupivacaine with 1.25 mcg/mL Fentanyl and 1:[id number] Epinephrine  Dose (mL): 10  Lockout (minutes): 15  1-Hour Limit (boluses/hr): 4  Basal Rate: 14        Assessment  Sensory level: T8 on L and T10 on R. bilateral  Block outcome: pain improved  Number of attempts:  1  Events: no positive test dose  Procedure assessment: patient tolerated procedure well with no immediate complications

## 2024-03-30 NOTE — ANESTHESIA PREPROCEDURE EVALUATION
Patient: Misael Diaz    Evaluation Method: In-person visit    Procedure Information    Date: 03/30/24  Procedure: Labor Consult         Relevant Problems   Hematology   (+) Anemia of mother in pregnancy, delivered with postpartum condition      Skin   (+) Eczema       Clinical information reviewed:    Allergies  Meds   Med Hx  Surg Hx            NPO Detail:  NPO/Void Status  Date of Last Liquid: 03/29/24  Time of Last Liquid: 1900  Date of Last Solid: 03/29/24  Time of Last Solid: 1900         OB/Gyn Evaluation    Present Pregnancy    Patient is pregnant now.   Obstetric History            Physical Exam    Airway  Mallampati: II  TM distance: >3 FB  Neck ROM: full     Cardiovascular    Dental    Pulmonary    Abdominal          Anesthesia Plan    History of general anesthesia?: no  History of complications of general anesthesia?: no    ASA 2     epidural     The patient is not a current smoker.  Patient was not previously instructed to abstain from smoking on day of procedure.  Patient did not smoke on day of procedure.    Anesthetic plan and risks discussed with patient.    Plan discussed with resident and attending.

## 2024-03-30 NOTE — PROGRESS NOTES
Intrapartum Progress Note    Assessment   [facility] JAY Diaz is a 23 y.o.  at 38w4d. LYSSA: 2024, by Last Menstrual Period.   FHT Category 1  Latent labor  Anemia, admission hgb = 12.2   GBS neg  Plan   Encourage frequent position changes as tolerated  Encourage ambulation as tolerated  Continue pitocin per protocol  Morphine ordered for pain management per patient request  Continue assessment of maternal and fetal wellbeing  Recheck as clinically indicated by maternal or fetal status  Anticipate active phase of labor    Laurita Alvarez, APRN-CNM, APRN-CNP    Subjective   Patient gianluca uncomfortably, coping appropriately. Desires pain management. CRB remains in place.    Pregnancy complicated by:  - Anemia, s/p IV iron, admission hgb = 12.2  - Low BMI (19.87)    Objective   Last Vitals:  Temp Pulse Resp BP MAP Pulse Ox   37.1 °C (98.8 °F) 92 18 104/53   99 %     Vitals Min/Max Last 24 Hours:  Temp  Min: 36.1 °C (97 °F)  Max: 37.1 °C (98.8 °F)  Pulse  Min: 85  Max: 112  Resp  Min: 18  Max: 22  BP  Min: 102/57  Max: 131/76    Physical Examination:  GENERAL: Examination reveals a well developed, well nourished, gravid female whose affect is appropriate. She is alert and cooperative.  LUNGS: normal respiratory effort  PSYCHOLOGICAL: awake and alert; oriented to person, place, and time    Fetal Monitoring      Baseline FHR: 140 per minute  Variability: moderate  Accelerations: no  Decelerations: none  TOCO: regular, every 2-3 minutes    Lab Review:  Lab Results   Component Value Date    ABO A 2024    LABRH POS 2024    ABSCRN NEG 2024     Lab Results   Component Value Date    WBC 6.2 2024    HGB 12.2 2024    HCT 39.6 2024     (H) 2024     2024

## 2024-03-30 NOTE — PROGRESS NOTES
Assessment    23 y.o.  at 38w4d  FHT Category 1  Active labor  GBA neg  Plan    Encourage frequent position changes as tolerated  Continue pitocin per protocol  Continue assessment of maternal and fetal wellbeing  Recheck as clinically indicated by maternal or fetal status  Anticipate second stage of labor    Laurita Alvarez, APRN-CNM, APRN-CNP    Subjective:  Misael Diaz is patient feeling increased rectal pressure.    Objective:  Fetal Monitoring      Baseline FHR: 140 per minute  Variability: moderate  Accelerations: no  Decelerations: none  TOCO: regular, every 2-3 minutes    Cervical Exam:  6 cm dilated, 90 effaced, 0 station    Membrane status: ruptured (AROM at 1407)    Pitocin is at 8 mu/min.    Vitals:    24 1411 24 1414 24 1426 24 1528   BP: 97/55 105/60 102/56 111/67   Pulse: 91 83 83 88   Resp:   18 18   Temp:   36.6 °C (97.9 °F) 36.8 °C (98.2 °F)   TempSrc:   Temporal Temporal   SpO2:   97% 97%   Weight:       Height:

## 2024-03-30 NOTE — PROGRESS NOTES
Assessment    23 y.o.  at 38w4d  FHT Category 1  Latent labor  IOL NRFHT; on pitocin  GBS negative    Plan    SSE: cervix visualized  CRB inserted through cervical os and inflated with 60cc saline.   Placement of balloon confirmed with gentle traction.   Patient tolerated well.  Encourage frequent position changes as tolerated  Encourage ambulation as tolerated  Start/Continue pitocin per protocol  Pain management per patient request  Continue assessment of maternal and fetal wellbeing  Recheck as clinically indicated by maternal or fetal status  Anticipate active phase of labor    Glenny Tenorio, APRN-CNM    Subjective:  Misael Diaz is laying in low fowlers; just returned from the restroom.  Is feeling contractions more intensely; coping as expected with breathing techniques.    Objective:  Fetal Monitoring      Baseline FHR: 145 per minute  Variability: moderate  Accelerations: yes  Decelerations: none  TOCO: irregular, every 2-5 minutes    Cervical Exam:  2.5 cm dilated, 60 effaced, -3 station    Membrane status: intact    Pitocin is at 4 mu/min.    Vitals:    24 0428 24 0527 24 0620 24 0640   BP: 121/65 102/57     Pulse: 109 101  99   Resp: 22 18 20    Temp:       TempSrc:       SpO2: 99% 99% 99% 98%   Weight:       Height:

## 2024-03-30 NOTE — H&P
Obstetrical Admission History and Physical     Tanzania JAY Diaz is a 23 y.o.  at 38w4d. LYSSA: 2024, by Last Menstrual Period presents to triage for contractions and constant pelvic pain. Estimated fetal weight: 6lbs. She has had prenatal care with LEONARD Juarez .    Chief Complaint: Contractions (X3 hours)    Assessment/Plan    IOL NRFHT  -admit to L&D  -obtain IV, CBC, Type and Screen  -late decel x1 in triage with minimal variability to follow  -irregular contractions noted on monitor  -SVE unchanged from 3/28; /-3; fetal presenting part low in the pelvis  -patient desires NCB; will begin pitocin    Anemia  -hemoglobin on 3/21 9.8; admission CBC pending    GBS negative    Dr. Serrato aware of admission    Principal Problem:    Non-reassuring electronic fetal monitoring tracing      Pregnancy Problems (from 22 to present)       Problem Noted Resolved    Nausea and vomiting in pregnancy 10/6/2023 by Zoë Huitron No    Priority:  Medium      Pregnancy 10/6/2023 by Zoë Huitron 2023 by Zenaida VACA MD          Options for delivery have been discussed with the patient and she elects for an induction of labor.  Cervical ripening with cytotec, cervidil, other prostaglandin agents has been discussed.  Induction of labor with pitocin, amniotomy, cytotec, and cervical balloon have been discussed in detail. The risks, benefits, complications, alternatives, expected outcomes, potential problems during recuperation and recovery, and the risks of not performing the procedure were discussed with the patient. The patient stated understanding that the risks of delivery include, but are not limited to: death; reaction to medications; injury to bowel, bladder, ureters, uterus, cervix, vagina, and other pelvic and abdominal structures, infection; blood loss and possible need for transfusion; and potential need for surgery, including hysterectomy. The risks of injury to the infant during delivery  were also discussed. All questions were answered. There was concurrence with the planned procedure, and the patient wanted to proceed.    Admit to inpatient status. I anticipate that this patient will require a stay exceeding at least 2 midnights for delivery and postpartum.  Induction of labor.  Management of pregnancy complications, as indicated.    Subjective   Good fetal movement. Denies vaginal bleeding., Denies leaking of fluid.       Reason for Induction of Labor:  Compromised fetal status with current fetal monitoring     Obstetrical History   OB History    Para Term  AB Living   2 0 0 0 1 0   SAB IAB Ectopic Multiple Live Births   1 0 0 0 0      # Outcome Date GA Lbr Jack/2nd Weight Sex Delivery Anes PTL Lv   2 Current            1 SAB 23 10w0d              Past Medical History  Past Medical History:   Diagnosis Date    Dermatitis, unspecified 2016    Eczema, unspecified type    Dysmenorrhea 10/06/2023    Encounter for immunization 2016    Immunization due    Encounter for immunization     Immunization due    Failure to thrive (child) 2016    Slow weight gain, child    Follicular cyst of the skin and subcutaneous tissue, unspecified 2016    Skin cyst    Other specified health status 2014    Known health problems: none    Other specified health status 2016    No pertinent past surgical history        Past Surgical History   Past Surgical History:   Procedure Laterality Date    CT AORTA AND BILATERAL ILIOFEMORAL RUNOFF ANGIOGRAM W AND/OR WO IV CONTRAST  2022    CT AORTA AND BILATERAL ILIOFEMORAL RUNOFF ANGIOGRAM W AND/OR WO IV CONTRAST 2022 [facility]    OTHER SURGICAL HISTORY  2019    No history of surgery       Social History  Social History     Tobacco Use    Smoking status: Never    Smokeless tobacco: Never   Substance Use Topics    Alcohol use: Not Currently     Substance and Sexual Activity   Drug Use Never        Allergies  Patient has no known allergies.     Medications  Medications Prior to Admission   Medication Sig Dispense Refill Last Dose    doxylamine (Unisom, doxylamine,) 25 mg tablet Take 1 tablet (25 mg) by mouth as needed at bedtime for sleep or nausea. 30 tablet 3     ferrous sulfate 325 (65 Fe) MG EC tablet Take 1 tablet by mouth 2 times a day. Do not crush, chew, or split. 60 tablet 3     prenatal no.167-folic acid-dha 400 mcg- 25 mg tablet,chewable Chew 1 tablet once daily. 90 tablet 3 Past Week    prenatal vit,cyndi 74-iron-folic 27 mg iron- 1 mg tablet Take 1 tablet by mouth once daily. 30 each 11        Objective    Last Vitals  Temp Pulse Resp BP MAP O2 Sat   36.1 °C (97 °F) (!) 112   108/78   100 %     Physical Examination  GENERAL: Examination reveals a well developed, well nourished, gravid female in no acute distress. She is alert and cooperative.  LUNGS:  normal effort  FHR is 150 , with Variable decelerations, Late decelerations, and a Category II tracing.    [place] reading: irregular contractions   The fetus is in a vertex presentation, determined by vaginal exam  Current Estimated Fetal Weight 6lbs established by Leopold's maneuver  CERVIX: 2 cm dilated, 60 % effaced, -3 station; MEMBRANES are Intact  PSYCHOLOGICAL: awake and alert; oriented to person, place, and time    Lab Review  Labs in chart were reviewed.

## 2024-03-30 NOTE — ED TRIAGE NOTES
PT currently 39 weeks pregnant, contractions 3 min apart. Set to be induced 4/5 with due date 4/9. Pt checked by Dr Alba.

## 2024-03-30 NOTE — PROGRESS NOTES
Assessment    23 y.o.  at 38w4d  FHT Category 2, overall reassuring with improved variability with fetal scalp stim  Second stage of labor  GBS neg  Plan    Will begin pushing with patient.   Continue assessment of maternal and fetal wellbeing  Patient status and plan of care reviewed with Dr. Gricelda Arrington NSVB    Laurita Alvarez, APRN-CNM, APRN-CNP    Subjective:  Misael JAY Diaz is comfortable with epidural. At bedside to assess for minimal variability.     Objective:  Fetal Monitoring      Baseline FHR: 135 per minute  Variability: minimal; variability improved with fetal scalp stim  Accelerations: no  Decelerations: early  TOCO: regular, every 2-3 minutes    Cervical Exam:  10 cm dilated, 100 effaced, +2 station    Membrane status: ruptured    Pitocin is at 8 mu/min.    Vitals:    24 1528 24 1630 24 1725 24 1726   BP: 111/67 123/69  109/71   Pulse: 88 87 86 85   Resp: 18 17  18   Temp: 36.8 °C (98.2 °F) 36.7 °C (98.1 °F)  36.6 °C (97.9 °F)   TempSrc: Temporal Temporal  Temporal   SpO2: 97%  100% 100%   Weight:       Height:

## 2024-03-31 PROCEDURE — 1100000001 HC PRIVATE ROOM DAILY

## 2024-03-31 PROCEDURE — 2500000001 HC RX 250 WO HCPCS SELF ADMINISTERED DRUGS (ALT 637 FOR MEDICARE OP)

## 2024-03-31 PROCEDURE — 1120000001 HC OB PRIVATE ROOM DAILY

## 2024-03-31 PROCEDURE — 2500000005 HC RX 250 GENERAL PHARMACY W/O HCPCS

## 2024-03-31 RX ORDER — HYDRALAZINE HYDROCHLORIDE 20 MG/ML
5 INJECTION INTRAMUSCULAR; INTRAVENOUS ONCE AS NEEDED
Status: DISCONTINUED | OUTPATIENT
Start: 2024-03-31 | End: 2024-04-01 | Stop reason: HOSPADM

## 2024-03-31 RX ORDER — SIMETHICONE 80 MG
80 TABLET,CHEWABLE ORAL 4 TIMES DAILY PRN
Status: DISCONTINUED | OUTPATIENT
Start: 2024-03-31 | End: 2024-04-01 | Stop reason: HOSPADM

## 2024-03-31 RX ORDER — DIPHENHYDRAMINE HCL 25 MG
25 CAPSULE ORAL EVERY 6 HOURS PRN
Status: DISCONTINUED | OUTPATIENT
Start: 2024-03-31 | End: 2024-04-01 | Stop reason: HOSPADM

## 2024-03-31 RX ORDER — OXYTOCIN 10 [USP'U]/ML
10 INJECTION, SOLUTION INTRAMUSCULAR; INTRAVENOUS ONCE AS NEEDED
Status: DISCONTINUED | OUTPATIENT
Start: 2024-03-31 | End: 2024-04-01 | Stop reason: HOSPADM

## 2024-03-31 RX ORDER — OXYTOCIN/0.9 % SODIUM CHLORIDE 30/500 ML
60 PLASTIC BAG, INJECTION (ML) INTRAVENOUS ONCE AS NEEDED
Status: DISCONTINUED | OUTPATIENT
Start: 2024-03-31 | End: 2024-04-01 | Stop reason: HOSPADM

## 2024-03-31 RX ORDER — CARBOPROST TROMETHAMINE 250 UG/ML
250 INJECTION, SOLUTION INTRAMUSCULAR ONCE AS NEEDED
Status: DISCONTINUED | OUTPATIENT
Start: 2024-03-31 | End: 2024-04-01 | Stop reason: HOSPADM

## 2024-03-31 RX ORDER — NIFEDIPINE 10 MG/1
10 CAPSULE ORAL ONCE AS NEEDED
Status: DISCONTINUED | OUTPATIENT
Start: 2024-03-31 | End: 2024-04-01 | Stop reason: HOSPADM

## 2024-03-31 RX ORDER — CYCLOBENZAPRINE HCL 10 MG
5 TABLET ORAL 3 TIMES DAILY PRN
Status: DISCONTINUED | OUTPATIENT
Start: 2024-03-31 | End: 2024-04-01 | Stop reason: HOSPADM

## 2024-03-31 RX ORDER — MEDROXYPROGESTERONE ACETATE 150 MG/ML
150 INJECTION, SUSPENSION INTRAMUSCULAR
Status: COMPLETED | OUTPATIENT
Start: 2024-03-31 | End: 2024-04-01

## 2024-03-31 RX ORDER — ONDANSETRON HYDROCHLORIDE 2 MG/ML
4 INJECTION, SOLUTION INTRAVENOUS EVERY 6 HOURS PRN
Status: DISCONTINUED | OUTPATIENT
Start: 2024-03-31 | End: 2024-04-01 | Stop reason: HOSPADM

## 2024-03-31 RX ORDER — ADHESIVE BANDAGE
10 BANDAGE TOPICAL
Status: DISCONTINUED | OUTPATIENT
Start: 2024-03-31 | End: 2024-04-01 | Stop reason: HOSPADM

## 2024-03-31 RX ORDER — POLYETHYLENE GLYCOL 3350 17 G/17G
17 POWDER, FOR SOLUTION ORAL 2 TIMES DAILY PRN
Qty: 14 PACKET | Refills: 0 | Status: SHIPPED | OUTPATIENT
Start: 2024-03-31

## 2024-03-31 RX ORDER — METHYLERGONOVINE MALEATE 0.2 MG/ML
0.2 INJECTION INTRAVENOUS ONCE AS NEEDED
Status: DISCONTINUED | OUTPATIENT
Start: 2024-03-31 | End: 2024-04-01 | Stop reason: HOSPADM

## 2024-03-31 RX ORDER — LABETALOL HYDROCHLORIDE 5 MG/ML
20 INJECTION, SOLUTION INTRAVENOUS ONCE AS NEEDED
Status: DISCONTINUED | OUTPATIENT
Start: 2024-03-31 | End: 2024-04-01 | Stop reason: HOSPADM

## 2024-03-31 RX ORDER — IBUPROFEN 600 MG/1
600 TABLET ORAL EVERY 6 HOURS PRN
Qty: 60 TABLET | Refills: 0 | Status: SHIPPED | OUTPATIENT
Start: 2024-03-31

## 2024-03-31 RX ORDER — ONDANSETRON 4 MG/1
4 TABLET, FILM COATED ORAL EVERY 6 HOURS PRN
Status: DISCONTINUED | OUTPATIENT
Start: 2024-03-31 | End: 2024-04-01 | Stop reason: HOSPADM

## 2024-03-31 RX ORDER — LIDOCAINE 560 MG/1
1 PATCH PERCUTANEOUS; TOPICAL; TRANSDERMAL
Status: DISCONTINUED | OUTPATIENT
Start: 2024-03-31 | End: 2024-04-01 | Stop reason: HOSPADM

## 2024-03-31 RX ORDER — BISACODYL 10 MG/1
10 SUPPOSITORY RECTAL DAILY PRN
Status: DISCONTINUED | OUTPATIENT
Start: 2024-03-31 | End: 2024-04-01 | Stop reason: HOSPADM

## 2024-03-31 RX ORDER — TRANEXAMIC ACID 100 MG/ML
1000 INJECTION, SOLUTION INTRAVENOUS ONCE AS NEEDED
Status: DISCONTINUED | OUTPATIENT
Start: 2024-03-31 | End: 2024-04-01 | Stop reason: HOSPADM

## 2024-03-31 RX ORDER — POLYETHYLENE GLYCOL 3350 17 G/17G
17 POWDER, FOR SOLUTION ORAL 2 TIMES DAILY PRN
Status: DISCONTINUED | OUTPATIENT
Start: 2024-03-31 | End: 2024-04-01 | Stop reason: HOSPADM

## 2024-03-31 RX ORDER — MISOPROSTOL 200 UG/1
800 TABLET ORAL ONCE AS NEEDED
Status: DISCONTINUED | OUTPATIENT
Start: 2024-03-31 | End: 2024-04-01 | Stop reason: HOSPADM

## 2024-03-31 RX ORDER — ACETAMINOPHEN 325 MG/1
975 TABLET ORAL EVERY 6 HOURS PRN
Qty: 120 TABLET | Refills: 0 | Status: SHIPPED | OUTPATIENT
Start: 2024-03-31

## 2024-03-31 RX ORDER — DIPHENHYDRAMINE HYDROCHLORIDE 50 MG/ML
25 INJECTION INTRAMUSCULAR; INTRAVENOUS EVERY 6 HOURS PRN
Status: DISCONTINUED | OUTPATIENT
Start: 2024-03-31 | End: 2024-04-01 | Stop reason: HOSPADM

## 2024-03-31 RX ORDER — LOPERAMIDE HYDROCHLORIDE 2 MG/1
4 CAPSULE ORAL EVERY 2 HOUR PRN
Status: DISCONTINUED | OUTPATIENT
Start: 2024-03-31 | End: 2024-04-01 | Stop reason: HOSPADM

## 2024-03-31 RX ORDER — CYCLOBENZAPRINE HCL 5 MG
5 TABLET ORAL 3 TIMES DAILY PRN
Qty: 10 TABLET | Refills: 0 | Status: SHIPPED | OUTPATIENT
Start: 2024-03-31

## 2024-03-31 RX ADMIN — BENZOCAINE AND LEVOMENTHOL 1 APPLICATION: 200; 5 SPRAY TOPICAL at 13:17

## 2024-03-31 RX ADMIN — IBUPROFEN 600 MG: 600 TABLET, FILM COATED ORAL at 07:02

## 2024-03-31 RX ADMIN — ACETAMINOPHEN 975 MG: 325 TABLET ORAL at 18:03

## 2024-03-31 RX ADMIN — ACETAMINOPHEN 975 MG: 325 TABLET ORAL at 07:03

## 2024-03-31 RX ADMIN — ACETAMINOPHEN 975 MG: 325 TABLET ORAL at 00:34

## 2024-03-31 RX ADMIN — IBUPROFEN 600 MG: 600 TABLET, FILM COATED ORAL at 21:53

## 2024-03-31 RX ADMIN — Medication 1 EACH: at 13:17

## 2024-03-31 RX ADMIN — IBUPROFEN 600 MG: 600 TABLET, FILM COATED ORAL at 00:35

## 2024-03-31 RX ADMIN — IBUPROFEN 600 MG: 600 TABLET, FILM COATED ORAL at 14:41

## 2024-03-31 ASSESSMENT — PAIN SCALES - GENERAL
PAINLEVEL_OUTOF10: 6
PAINLEVEL_OUTOF10: 7
PAINLEVEL_OUTOF10: 0 - NO PAIN
PAINLEVEL_OUTOF10: 6
PAINLEVEL_OUTOF10: 7
PAINLEVEL_OUTOF10: 8
PAINLEVEL_OUTOF10: 3
PAINLEVEL_OUTOF10: 0 - NO PAIN
PAINLEVEL_OUTOF10: 2

## 2024-03-31 ASSESSMENT — PAIN DESCRIPTION - DESCRIPTORS: DESCRIPTORS: CRAMPING

## 2024-03-31 NOTE — CARE PLAN
The patient's goals for the shift include patient will progress her labor    The clinical goals for the shift include to bond with baby    Over the shift, the patient is progressing to meet postpartum milestones. Continue with plan of care

## 2024-03-31 NOTE — DISCHARGE INSTRUCTIONS

## 2024-03-31 NOTE — PROGRESS NOTES
Postpartum Progress Note    Assessment/Plan   Misael Diaz is a 23 y.o., , who delivered at 38w4d gestation after IOL for NRFHT and is now postpartum day 1.    Now PPD#1 s/p  on 3/30 c/b uterine prolapse and 2nd degree lac s/p repair  - continue routine postpartum care  - pain well controlled on po medications  - DVT Score: 3, for ppx SCDs  - Hgb:   Results from last 7 days   Lab Units 24  0217   HEMOGLOBIN g/dL 12.2       Maternal Well-Being  - emotional support provided  - bonding with infant     Feeding  - breastfeeding/pumping encouraged; lactation consult prn    Contraception  - Depo Provera prior to discharge    Dispo  - anticipate d/c on PPD #2 if meeting all postpartum milestones  - for f/u 4-6 weeks with Primary OB provider      Principal Problem:    Non-reassuring electronic fetal monitoring tracing    Pregnancy Problems (from 22 to present)       Problem Noted Resolved    Nausea and vomiting in pregnancy 10/6/2023 by Zoë Huitron No    Priority:  Medium      Pregnancy 10/6/2023 by Zoë Huitron 2023 by Zenaida VACA MD          Hospital course: no complications  Vaginal Birth  Patient is currently breastfeedingThe patient's blood type is A POS. The baby's blood type is pending . Rhogam is not indicated.    Subjective   Her pain is well controlled with current medications  She is passing flatus  She is ambulating well  She is tolerating a Adult diet Regular  She reports no breast or nursing problems  She denies emotional concerns today   Her plan for contraception is Depo Provera     Concerned about uterine prolapse. Denies feeling like there is anything bulging in her vagina/ vaginal pressure. Report perirectal pain with sitting 2/2 hemorrhoid     Objective   Allergies:   Patient has no known allergies.         Last Vitals:  Temp Pulse Resp BP MAP Pulse Ox   36 °C (96.8 °F) 78 18 98/61   95 %     Vitals Min/Max Last 24 Hours:  Temp  Min: 36 °C (96.8 °F)   Max: 37.1 °C (98.8 °F)  Pulse  Min: 70  Max: 128  Resp  Min: 16  Max: 18  BP  Min: 94/53  Max: 144/73    Intake/Output:     Intake/Output Summary (Last 24 hours) at 3/31/2024 1328  Last data filed at 3/31/2024 0700  Gross per 24 hour   Intake 3214.1 ml   Output 1776 ml   Net 1438.1 ml       Physical Exam:  General: Examination reveals a well developed, well nourished, female, in no acute distress. She is alert and cooperative.  Lungs: symmetrical, non-labored breathing.  Cardiac: warm, well-perfused.  Abdomen: soft, non-tender.  Fundus: firm, below umbilicus, and nontender. External hemorrhoid present on rectum  Extremities: no redness or tenderness in the calves or thighs.  Neurological: alert, oriented, normal speech, no focal findings or movement disorder noted.     Lab Data:  Labs in chart were reviewed.

## 2024-03-31 NOTE — LACTATION NOTE
Lactation Consultant Note  Lactation Consultation       Maternal Information       Maternal Assessment       Infant Assessment       Feeding Assessment       LATCH TOOL       Breast Pump       Other OB Lactation Tools       Patient Follow-up       Other OB Lactation Documentation       Recommendations/Summary  Attempted to see mother to discuss breast feeding. Mother and infant in a deep sleep state, father of baby at bedside states mother just  infant. I encouraged him to let mother know I stopped by and to call with next feed.

## 2024-03-31 NOTE — L&D DELIVERY NOTE
OB Vaginal Delivery Note    3/30/2024  Misael Diaz  23 y.o.    Review the Delivery Report for details.     Gestational Age: 38w4d  /Para:   Labor Complications:    Estimated Blood Loss:   Delivery Blood Loss  24 0113 - 24 2347      Est. Blood Loss Hospital Encounter 25 mL    Quantitative Blood Loss - (mL) Hospital Encounter 400 mL    Total  425 mL          Quantitative Blood Loss: 400 mL  Delivery Type: Vaginal, Spontaneous   ROM to Delivery Time: 6h 20m   Sex: Female   Weight: 3.06 kg    1 Minute 5 Minute 10 Minute   Apgar Totals: 8   9          Morales Diaz [[id number]]      Delivery Providers    Delivering clinician: TARAN Valadez-LEONARD   Provider Role    Rosanna Frausto, RN Delivery Nurse    eKena Camargo, RN Nursery Nurse     Resident               Delivery Details:  Misael Diazmorenita 23 y.o.  female delivered a viable Female infant with Apgars of 8  and 9 .. The patient was put in the dorsal lithotomy position. Delivery was via Vaginal, Spontaneous  to a sterile field under Epidural  anesthesia. Infant delivered in Vertex  DONN position. Nuchal cord x1 noted and was easily reduced. Anterior and posterior shoulders delivered without difficulty. Upon  delivery placenta was noted at the vaginal introitus with stage 2 uterine prolapse present. Dr. Lyons called in to assess uterine prolapse.  The cord was clamped twice, cut and 3 vessels  were noted. Cord blood was obtained in routine fashion with the following disposition: Lab .  Intact  placenta delivered at 3/30/2024  8:30 PM . Dr. Lyons present to ensure proper uterine positioning. Second IV access was acquired. Uterine placement found to be stable and fundal massage performed and fundus found to be firm. Perineum, vagina, cervix were inspected, and the following lacerations were noted:   Morales Diaz [[id number]]      Lacerations    Episiotomy: None  Perineal laceration:  2nd  Perineal laceration repaired?: Yes  Other lacerations?: No  Repair suture: 3-0 Synthetic Suture            Any lacerations were repaired in the usual fashion using 3-0 Synthetic Suture . Excellent hemostasis was noted. Needle count correct. Infant and patient in delivery room in good and stable condition.     TARAN Valadez-LISETM

## 2024-03-31 NOTE — CARE PLAN
The patient's goals for the shift include patient will progress her labor    The clinical goals for the shift include Patient FHT will remain rassuring through labor process    Problem: Vaginal Birth or  Section  Goal: Minimal s/sx of HDP and BP<160/110  Outcome: Progressing     Problem: Pain - Adult  Goal: Verbalizes/displays adequate comfort level or baseline comfort level  Outcome: Progressing     Problem: Postpartum  Goal: Minimal s/sx of HDP and BP<160/110  Outcome: Progressing  Goal: No s/sx of hemorrhage  Outcome: Progressing

## 2024-03-31 NOTE — LACTATION NOTE
Lactation Consultant Note          24 1210   Lactation Consultation   Reason for Consult Initial assessment   Consultant Name Aubrie Ledesma RN, IBCLC   Maternal Information   Has mother  before? No   Infant to breast within first 2 hours of birth? Yes   Exclusive Pump and Bottle Feed No   Maternal Assessment   Breast Assessment Small;Soft;Warm;Compressible  (easily expressible)   Nipple Assessment Intact;Erect   Areola Assessment Normal   Infant Assessment   Infant Behavior Quiet alert;Rooting response   Infant Assessment Good cupping of tongue   Feeding Assessment   Nutrition Source Breastmilk;Formula (per mother’s request)   Feeding Method Nursing at the breast;Paced bottle   Feeding Position Breast sandwich;Both sides;Cross - cradle;Skin to skin;Mother needs assistance with latch/positioning;Football/seated   Latch Assessment Reluctant   LATCH Tool   Latch 0   Audible Swallowing 0   Type of Nipple 2   Comfort (Breast/Nipple) 2   Hold (Positioning) 0   LATCH Score 4   Other OB Lactation Documentation    Maternal Risk Factors Other (comment)  (prolapsed uterus)   Infant Risk Factors Early term birth 37-39 weeks       Recommendations/Summary  Called to bedside as mother had questions about breastfeeding, milk supply how often to breast feed infant and when she can start pumping. Upon entering the room, mother was feeding infant formula via bottle. Discussed with mother normal  feeding pattern and milk supply pattern in the first few days after delivery, recommendations to infant based on feeding, waking infant if it has been 3 hours since last feed, and feeding infant on first breast until she unlatches or until tactile stimulation is not keeping her sucking/swallowing at breast. I then recommended burping infant and then trying to latch/feed infant on other breast. Discussed how pumping and even formula supplementation not necessary at this time but can move forward with it per her  preference. We attempted to latch infant to left and right breasts in football hand cross-cradle hold with no success. Infant quiet and alert, showing some rooting and feeding cues however when brought to breast and even drop of colostrum on nipple, infant reluctant to latch. I realized mother had fed 15 ml formula prior to these attempts so we determined infant probably not in a place to latch at this time. Mother tried to order a Spectra pump through a company called [employer]. I will pass along to follow up with order or try to order her a new pump. Outpatient lactation resources discussed with mother. I encouraged mother to call for any questions, concerns or assistance.

## 2024-04-01 VITALS
HEIGHT: 62 IN | DIASTOLIC BLOOD PRESSURE: 63 MMHG | BODY MASS INDEX: 20 KG/M2 | TEMPERATURE: 98.2 F | SYSTOLIC BLOOD PRESSURE: 102 MMHG | HEART RATE: 83 BPM | OXYGEN SATURATION: 99 % | RESPIRATION RATE: 18 BRPM | WEIGHT: 108.69 LBS

## 2024-04-01 PROCEDURE — 2500000001 HC RX 250 WO HCPCS SELF ADMINISTERED DRUGS (ALT 637 FOR MEDICARE OP)

## 2024-04-01 PROCEDURE — 2500000004 HC RX 250 GENERAL PHARMACY W/ HCPCS (ALT 636 FOR OP/ED)

## 2024-04-01 RX ADMIN — IBUPROFEN 600 MG: 600 TABLET, FILM COATED ORAL at 10:29

## 2024-04-01 RX ADMIN — IBUPROFEN 600 MG: 600 TABLET, FILM COATED ORAL at 04:30

## 2024-04-01 RX ADMIN — ACETAMINOPHEN 975 MG: 325 TABLET ORAL at 06:11

## 2024-04-01 RX ADMIN — ACETAMINOPHEN 975 MG: 325 TABLET ORAL at 00:10

## 2024-04-01 RX ADMIN — MEDROXYPROGESTERONE ACETATE 150 MG: 150 INJECTION, SUSPENSION INTRAMUSCULAR at 10:31

## 2024-04-01 ASSESSMENT — PAIN SCALES - GENERAL
PAINLEVEL_OUTOF10: 5 - MODERATE PAIN
PAINLEVEL_OUTOF10: 5 - MODERATE PAIN
PAINLEVEL_OUTOF10: 6
PAINLEVEL_OUTOF10: 6
PAINLEVEL_OUTOF10: 0 - NO PAIN

## 2024-04-01 ASSESSMENT — PAIN - FUNCTIONAL ASSESSMENT: PAIN_FUNCTIONAL_ASSESSMENT: 0-10

## 2024-04-01 ASSESSMENT — ACTIVITIES OF DAILY LIVING (ADL): LACK_OF_TRANSPORTATION: NO

## 2024-04-01 NOTE — CARE PLAN
The patient's goals for the shift include decrease pain, pump and breastfeed    The clinical goals for the shift include vitals to remain WNL    Over the shift, the patient did not make progress toward the following goals. Barriers to progression include none. Recommendations to address these barriers include none.

## 2024-04-01 NOTE — CARE PLAN
Problem: Vaginal Birth or  Section  Goal: Fetal and maternal status remain reassuring during the birth process  Outcome: Progressing  Goal: Prevention of malpresentation/labor dystocia through delivery  Outcome: Progressing  Goal: Demonstrates labor coping techniques through delivery  Outcome: Progressing  Goal: Minimal s/sx of HDP and BP<160/110  Outcome: Progressing  Goal: No s/sx of infection through recovery  Outcome: Progressing  Goal: No s/sx of hemorrhage through recovery  Outcome: Progressing     Problem: Pain - Adult  Goal: Verbalizes/displays adequate comfort level or baseline comfort level  Outcome: Progressing     Problem: Safety - Adult  Goal: Free from fall injury  Outcome: Progressing     Problem: Postpartum  Goal: Minimal s/sx of HDP and BP<160/110  Outcome: Progressing  Goal: No s/sx of hemorrhage  Outcome: Progressing   The patient's goals for the shift include decrease pain, pump and breastfeed    The clinical goals for the shift include Stable VS,  care    VS and assessments stable throughout shift, pt pain controlled with PO meds, breastfeeding exclusively and gaining confidence in  care

## 2024-04-01 NOTE — DISCHARGE SUMMARY
Discharge Summary    Admission Date: 3/30/2024  Discharge Date: 2024     Discharge Diagnosis  Non-reassuring electronic fetal monitoring tracing    Hospital Course  Delivery Date: 3/30/2024  8:27 PM   Delivery type: Vaginal, Spontaneous    GA at delivery: 38w4d  Outcome: Living   Anesthesia during delivery: Epidural   Intrapartum complications:    Feeding method: Breastfeeding Status: Yes       Procedures:    Contraception at discharge: Depo Provera    Postpartum Course:  - Pain controlled on PO meds  - Hgb: 12.2   - mild range BP w/o dx of HDP, no pec sx     Patient seen on day of DC. Continuing to meet all PP milestones. All questions/concerns addressed. She is medically stable and feels comfortable going home today w/ follow up as below.    LAURIE Vines/Mckenna    Pertinent Physical Exam At Time of Discharge  General: Examination reveals a well developed, well nourished, female, in no acute distress. She is alert and cooperative.  Abdomen: soft, non-distended, non-tender to palpation.  Fundus: firm, below umbilicus, and nontender.  Psychological: awake and alert; oriented to person, place, and time.  Skin: no rashes or lesions     Discharge Meds     Your medication list        START taking these medications        Instructions Last Dose Given Next Dose Due   acetaminophen 325 mg tablet  Commonly known as: Tylenol      Take 3 tablets (975 mg) by mouth every 6 hours if needed for mild pain (1 - 3) or moderate pain (4 - 6).       cyclobenzaprine 5 mg tablet  Commonly known as: Flexeril      Take 1 tablet (5 mg) by mouth 3 times a day as needed for muscle spasms.       ibuprofen 600 mg tablet      Take 1 tablet (600 mg) by mouth every 6 hours if needed for mild pain (1 - 3) or moderate pain (4 - 6).       polyethylene glycol 17 gram packet  Commonly known as: Glycolax, Miralax      Take 17 g by mouth 2 times a day as needed ([id number]).              CONTINUE taking these medications         Instructions Last Dose Given Next Dose Due   prenatal vit,cyndi 74-iron-folic 27 mg iron- 1 mg tablet      Take 1 tablet by mouth once daily.              STOP taking these medications      doxylamine 25 mg tablet  Commonly known as: Unisom (doxylamine)        ferrous sulfate 325 (65 Fe) MG EC tablet        prenatal no.167-folic acid-dha 400 mcg- 25 mg tablet,chewable                  Where to Get Your Medications        These medications were sent to [facility]/pharmacy #[id number] [facility] [id number] [facility]  [id number] [place] [id number]      Phone: [phone]   acetaminophen 325 mg tablet  cyclobenzaprine 5 mg tablet  ibuprofen 600 mg tablet  polyethylene glycol 17 gram packet          Complications Requiring Follow-Up  Follow up in 4-6 wk for postpartum visit with your OB provider.     Test Results Pending At Discharge  Pending Labs       No current pending labs.            Outpatient Follow-Up  Future Appointments   Date Time Provider Department Center   4/4/2024  2:00 PM TARAN Huerta-CNM [id number] [facility]   4/6/2024  2:00 PM MAC2 L&D PROCEDURE ROOM 1 [facility]       I spent 20 minutes in the professional and overall care of this patient.      Cat Nunez PA-C

## 2024-04-03 LAB
BLOOD EXPIRATION DATE: NORMAL
DISPENSE STATUS: NORMAL
PRODUCT BLOOD TYPE: 6200
PRODUCT CODE: NORMAL
UNIT ABO: NORMAL
UNIT NUMBER: NORMAL
UNIT RH: NORMAL
UNIT VOLUME: 350
XM INTEP: NORMAL

## 2024-04-04 ENCOUNTER — APPOINTMENT (OUTPATIENT)
Dept: OBSTETRICS AND GYNECOLOGY | Facility: CLINIC | Age: 24
End: 2024-04-04
Payer: MEDICAID

## 2024-04-05 ENCOUNTER — TELEPHONE (OUTPATIENT)
Dept: OBSTETRICS AND GYNECOLOGY | Facility: CLINIC | Age: 24
End: 2024-04-05
Payer: MEDICAID

## 2024-04-05 NOTE — TELEPHONE ENCOUNTER
Name/ verified  Called pt back in regards to lump under armpit. Pt scheduled first available apt with Roselyn Juarez on 4/15 at [place] location at 9:15. Pt verbalized understanding.

## 2024-04-09 ENCOUNTER — OFFICE VISIT (OUTPATIENT)
Dept: OBSTETRICS AND GYNECOLOGY | Facility: CLINIC | Age: 24
End: 2024-04-09
Payer: MEDICAID

## 2024-04-09 VITALS
BODY MASS INDEX: 18.03 KG/M2 | WEIGHT: 98 LBS | DIASTOLIC BLOOD PRESSURE: 60 MMHG | SYSTOLIC BLOOD PRESSURE: 110 MMHG | HEIGHT: 62 IN

## 2024-04-09 DIAGNOSIS — N63.31 LUMP OF AXILLARY TAIL OF RIGHT BREAST: Primary | ICD-10-CM

## 2024-04-09 PROCEDURE — 99214 OFFICE O/P EST MOD 30 MIN: CPT | Performed by: ADVANCED PRACTICE MIDWIFE

## 2024-04-09 PROCEDURE — 1036F TOBACCO NON-USER: CPT | Performed by: ADVANCED PRACTICE MIDWIFE

## 2024-04-09 ASSESSMENT — ENCOUNTER SYMPTOMS
LOSS OF SENSATION IN FEET: 0
OCCASIONAL FEELINGS OF UNSTEADINESS: 0
DEPRESSION: 0

## 2024-04-09 NOTE — PROGRESS NOTES
Plan    Apply gentle massage to area before and during feedings.   Apply ice pack to area after feeds.   Make an appointment for 6-week follow-up and will reevaluate lump at next apt.           Areli Durand RN    Subjective   23 y.o.  presenting with complaints of lump in armpit. Patient reports it was present before birth and after delivery with onset of lactogenesis II got very large and now has gotten smaller.   Patient reports she is breastfeeding, without difficulties.   Delivery Date: 3/30/2024  Gestational Age: <None>   Type of Delivery: Vaginal, Spontaneous      Pregnancy Problems (from 22 to present)       Problem Noted Resolved    Nausea and vomiting in pregnancy 10/6/2023 by Zoë Huitron No    Priority:  Medium      Perineal laceration with delivery, second degree, delivered 3/31/2024 by TARAN Adame-CNP No    Pregnancy 10/6/2023 by Zoë Huitron 2023 by Zenaida VACA MD            Concerns: lump in axilla      Postpartum Depression: Not on file           PHYSICAL EXAM    Patient is alert and oriented. Calm and cooperative. Unlabored breathing. Small lump palpated in axilla tail of the right breast. Non-tender to the touch.

## 2024-04-15 ENCOUNTER — APPOINTMENT (OUTPATIENT)
Dept: OBSTETRICS AND GYNECOLOGY | Facility: CLINIC | Age: 24
End: 2024-04-15
Payer: MEDICAID

## 2024-04-30 ENCOUNTER — POSTPARTUM VISIT (OUTPATIENT)
Dept: OBSTETRICS AND GYNECOLOGY | Facility: CLINIC | Age: 24
End: 2024-04-30
Payer: MEDICAID

## 2024-04-30 VITALS
DIASTOLIC BLOOD PRESSURE: 60 MMHG | HEIGHT: 62 IN | WEIGHT: 100 LBS | BODY MASS INDEX: 18.4 KG/M2 | SYSTOLIC BLOOD PRESSURE: 96 MMHG

## 2024-04-30 DIAGNOSIS — M53.3 COCCYX PAIN: Primary | ICD-10-CM

## 2024-04-30 ASSESSMENT — PAIN SCALES - GENERAL: PAINLEVEL: 0-NO PAIN

## 2024-04-30 NOTE — PROGRESS NOTES
Plan    Advised to call office for breast complaints, abnormal bleeding, mood changes, or other concerning symptoms.   Cleared to resume normal activity as desired  Cleared for exercise, hot tubs and swimming.  You can also resume using tampons.    Please schedule your annual exam in 3 months.  We will do a breast and pelvic exam and do a pap if you are due.  If you need refills on your birth control, I will refill it for a year at this time.        Lynn Juarez, TARAN-LISETM    Subjective   23 y.o.  presenting for postpartum follow-up     Delivery Date: 3-  Gestational Age: <None>   Type of Delivery: Vaginal, Spontaneous      Pregnancy Problems (from 23 to present)       Problem Noted Resolved    Perineal laceration with delivery, second degree, delivered ([facility]) 3/31/2024 by TARAN Adame-CNP No    Priority:  Medium      Nausea and vomiting in pregnancy ([facility]) 10/6/2023 by Zoë Huitron No    Priority:  Medium      Pregnancy ([facility]) 10/6/2023 by Zoë Huitron 2023 by Zenaida VACA MD            Concerns: Tailbone pain    Pain: controlled  Lacerations: 2nd degree  Lochia: alba  Perineal Pain:  no  Sexual Intimacy: No  Contraceptive Method: Depo Provera  Feeding Method: She is breast feeding exclusively. no breast or nursing problems  Lactation Consult Needed?: No    Sleep not a lot -- she is good with going to sleep when the baby sleeps.  Does 2-2.5 streches at night  Help at home yes  Any mood disorder, anxiety or birth trauma no    Birth Trauma: No, but thinks about what if uterus comes out next time.  Bonding with Baby: well with baby girl,  Mood:   Postpartum Depression: Not on file       Last pap:     PHYSICAL EXAM  Consitutional:  A&O x 3  Mood:  Calm and cooperative  Abdomen:  Soft, NT  EGBUS:  Perineum intact  VAGINA:  Atrophic  Cervix:  L/T/C  Uterus: Involuted.  Small and mobile  Adnexa: NT  Extremities:  No edema noted

## 2024-06-06 ENCOUNTER — APPOINTMENT (OUTPATIENT)
Dept: PHYSICAL THERAPY | Facility: HOSPITAL | Age: 24
End: 2024-06-06
Payer: MEDICAID

## 2024-06-25 ENCOUNTER — APPOINTMENT (OUTPATIENT)
Dept: OBSTETRICS AND GYNECOLOGY | Facility: CLINIC | Age: 24
End: 2024-06-25
Payer: MEDICAID

## 2024-06-25 VITALS — SYSTOLIC BLOOD PRESSURE: 102 MMHG | BODY MASS INDEX: 18.29 KG/M2 | WEIGHT: 100 LBS | DIASTOLIC BLOOD PRESSURE: 64 MMHG

## 2024-06-25 DIAGNOSIS — Z01.419 WELL WOMAN EXAM: Primary | ICD-10-CM

## 2024-06-25 DIAGNOSIS — Z30.42 DEPO-PROVERA CONTRACEPTIVE STATUS: ICD-10-CM

## 2024-06-25 DIAGNOSIS — B37.89 YEAST INFECTION OF NIPPLE, POSTPARTUM (HHS-HCC): ICD-10-CM

## 2024-06-25 DIAGNOSIS — B37.89 YEAST INFECTION OF NIPPLE, POSTPARTUM (HHS-HCC): Primary | ICD-10-CM

## 2024-06-25 PROCEDURE — 1036F TOBACCO NON-USER: CPT | Performed by: ADVANCED PRACTICE MIDWIFE

## 2024-06-25 PROCEDURE — 96372 THER/PROPH/DIAG INJ SC/IM: CPT | Performed by: ADVANCED PRACTICE MIDWIFE

## 2024-06-25 PROCEDURE — 99395 PREV VISIT EST AGE 18-39: CPT | Performed by: ADVANCED PRACTICE MIDWIFE

## 2024-06-25 RX ORDER — FLUCONAZOLE 150 MG/1
150 TABLET ORAL SEE ADMIN INSTRUCTIONS
Qty: 2 TABLET | Refills: 0 | Status: SHIPPED | OUTPATIENT
Start: 2024-06-25 | End: 2024-06-25 | Stop reason: SDUPTHER

## 2024-06-25 RX ORDER — MEDROXYPROGESTERONE ACETATE 150 MG/ML
150 INJECTION, SUSPENSION INTRAMUSCULAR ONCE
Status: COMPLETED | OUTPATIENT
Start: 2024-06-25 | End: 2024-06-25

## 2024-06-25 RX ORDER — FLUCONAZOLE 150 MG/1
150 TABLET ORAL SEE ADMIN INSTRUCTIONS
Qty: 2 TABLET | Refills: 0 | Status: SHIPPED | OUTPATIENT
Start: 2024-06-25 | End: 2024-07-05

## 2024-06-25 RX ORDER — FLUCONAZOLE 150 MG/1
150 TABLET ORAL SEE ADMIN INSTRUCTIONS
Qty: 2 TABLET | Refills: 0 | Status: SHIPPED | OUTPATIENT
Start: 2024-06-25 | End: 2024-06-25

## 2024-06-25 RX ORDER — FLUCONAZOLE 150 MG/1
150 TABLET ORAL ONCE
Qty: 10 TABLET | Refills: 0 | Status: SHIPPED | OUTPATIENT
Start: 2024-06-25 | End: 2024-06-25

## 2024-06-25 ASSESSMENT — EDINBURGH POSTNATAL DEPRESSION SCALE (EPDS)
THINGS HAVE BEEN GETTING ON TOP OF ME: NO, I HAVE BEEN COPING AS WELL AS EVER
I HAVE BEEN ANXIOUS OR WORRIED FOR NO GOOD REASON: NO, NOT AT ALL
I HAVE BLAMED MYSELF UNNECESSARILY WHEN THINGS WENT WRONG: NO, NEVER
I HAVE BEEN SO UNHAPPY THAT I HAVE BEEN CRYING: NO, NEVER
I HAVE FELT SAD OR MISERABLE: NO, NOT AT ALL
TOTAL SCORE: 2
I HAVE LOOKED FORWARD WITH ENJOYMENT TO THINGS: AS MUCH AS I EVER DID
I HAVE BEEN SO UNHAPPY THAT I HAVE HAD DIFFICULTY SLEEPING: NOT AT ALL
I HAVE BEEN ABLE TO LAUGH AND SEE THE FUNNY SIDE OF THINGS: AS MUCH AS I ALWAYS COULD
I HAVE FELT SCARED OR PANICKY FOR NO GOOD REASON: YES, SOMETIMES
THE THOUGHT OF HARMING MYSELF HAS OCCURRED TO ME: NEVER

## 2024-06-25 ASSESSMENT — ENCOUNTER SYMPTOMS
OCCASIONAL FEELINGS OF UNSTEADINESS: 0
DEPRESSION: 0
LOSS OF SENSATION IN FEET: 0

## 2024-06-25 ASSESSMENT — PAIN SCALES - GENERAL: PAINLEVEL: 0-NO PAIN

## 2024-06-25 ASSESSMENT — PATIENT HEALTH QUESTIONNAIRE - PHQ9
1. LITTLE INTEREST OR PLEASURE IN DOING THINGS: NOT AT ALL
SUM OF ALL RESPONSES TO PHQ9 QUESTIONS 1 AND 2: 0
2. FEELING DOWN, DEPRESSED OR HOPELESS: NOT AT ALL

## 2024-06-25 NOTE — PROGRESS NOTES
Pt received Depo injection restart. Education provided.  Name/ verified  Allergies verified  Next:9/10-  Annual:  Brand:Jesse  NDC:[id number]  LOT#[id number]    Location:right ventrogluteal IM  Pt tolerated well

## 2024-06-25 NOTE — PROGRESS NOTES
Assessment/Plan   Problem List Items Addressed This Visit       Well woman exam - Primary    Overview     History  Age of menarche: 11  Last pap: ASCUS HPV NEG -- due 2025  History of abnormal: no  Mammogram: N/A  History of abnormal: N/A   History of STI: No  Contraception: DMPA    Sexual Health  Active with: not active now  Pain:  Lubrication:  Orgasm:    Family Cancer History  Breast: No  Ovarian: No  Endometrial: No  Colon: No                Lynn Juarez, APRN-CNM     Subjective   Misael Diaz is a 23 y.o. female who is here for a routine exam. Periods are  spotty secondary to DMPA , lasting  irregular  days. Dysmenorrhea:none. Cyclic symptoms include none. No intermenstrual bleeding, spotting, or discharge.    Has nipple dryness - baby just treated for thrush    ROS      /64   Wt 45.4 kg (100 lb)   Breastfeeding Yes   BMI 18.29 kg/m²     General:   Alert and oriented x 3   Heart:  Thyroid: Regular rate, rhythm  Euthyroid, normal shape and size   Lungs:  Breast: Clear to auscultation bilaterally  Symmetrical, no skin changes/nipple discharge, redness, tenderness, no masses palpated bilaterally   Abdomen: Soft, non tender   Vulva: EGBUS normal   Vagina: Pink, normal discharge   Cervix: No CMT   Uterus: Normal shape, size   Adnexa: NT bilaterally       Thank you for coming to see me for your visit today and most importantly thank you for having a preventative visit.  If lab work was sent, you will see your test result via [employer]  Any prescriptions will be sent electronically to your pharmacy listed    I look forward to seeing you next year for your health care needs.  Please remember:   Sleep is important!   Exercise such as walking for 20 minutes per day is beneficial to your physical and mental health   Sleep is so important and should be a priority!   Healthy diets can be expensive -- if you every feel like you are struggling to afford healthy food, please let me know as we have a very good  program called [employer] that is available to you   Decrease alcohol and tobacco    Be well!  Roselyn

## 2024-10-24 ENCOUNTER — OFFICE VISIT (OUTPATIENT)
Dept: OBSTETRICS AND GYNECOLOGY | Facility: CLINIC | Age: 24
End: 2024-10-24
Payer: MEDICAID

## 2024-10-24 DIAGNOSIS — Z30.42 DEPO-PROVERA CONTRACEPTIVE STATUS: Primary | ICD-10-CM

## 2024-10-24 RX ORDER — MEDROXYPROGESTERONE ACETATE 150 MG/ML
150 INJECTION, SUSPENSION INTRAMUSCULAR
Qty: 1 ML | Refills: 0 | Status: SHIPPED | OUTPATIENT
Start: 2024-10-24

## 2024-10-24 ASSESSMENT — PATIENT HEALTH QUESTIONNAIRE - PHQ9
2. FEELING DOWN, DEPRESSED OR HOPELESS: NOT AT ALL
1. LITTLE INTEREST OR PLEASURE IN DOING THINGS: NOT AT ALL
SUM OF ALL RESPONSES TO PHQ9 QUESTIONS 1 AND 2: 0

## 2024-10-24 ASSESSMENT — ENCOUNTER SYMPTOMS
LOSS OF SENSATION IN FEET: 0
OCCASIONAL FEELINGS OF UNSTEADINESS: 0
DEPRESSION: 0

## 2024-11-01 ENCOUNTER — APPOINTMENT (OUTPATIENT)
Dept: OBSTETRICS AND GYNECOLOGY | Facility: CLINIC | Age: 24
End: 2024-11-01
Payer: MEDICAID

## 2024-11-15 ENCOUNTER — TELEPHONE (OUTPATIENT)
Dept: OBSTETRICS AND GYNECOLOGY | Facility: CLINIC | Age: 24
End: 2024-11-15
Payer: MEDICAID

## 2024-11-15 NOTE — TELEPHONE ENCOUNTER
Patient was calling to make sure that our location would have her depo shot in office before her appointment on Friday. She stated that someone told her that she would need to go to the pharmacy and pick it up but would like the office to carry it.

## 2024-11-18 RX ORDER — MEDROXYPROGESTERONE ACETATE 150 MG/ML
150 INJECTION, SUSPENSION INTRAMUSCULAR
Qty: 1 ML | Refills: 4 | Status: SHIPPED | OUTPATIENT
Start: 2024-11-18

## 2024-11-18 RX ORDER — MEDROXYPROGESTERONE ACETATE 150 MG/ML
150 INJECTION, SUSPENSION INTRAMUSCULAR
Qty: 1 ML | Refills: 0 | Status: SHIPPED | OUTPATIENT
Start: 2024-11-18

## 2024-11-21 ENCOUNTER — OFFICE VISIT (OUTPATIENT)
Dept: OBSTETRICS AND GYNECOLOGY | Facility: CLINIC | Age: 24
End: 2024-11-21
Payer: MEDICAID

## 2024-11-21 VITALS — HEIGHT: 62 IN | BODY MASS INDEX: 18.22 KG/M2 | WEIGHT: 99 LBS

## 2024-11-21 DIAGNOSIS — Z00.00 WELL WOMAN EXAM (NO GYNECOLOGICAL EXAM): ICD-10-CM

## 2024-11-21 DIAGNOSIS — Z30.42 DEPO-PROVERA CONTRACEPTIVE STATUS: ICD-10-CM

## 2024-11-21 DIAGNOSIS — R63.6 UNDERWEIGHT (BMI < 18.5): ICD-10-CM

## 2024-11-21 DIAGNOSIS — Z30.09 BIRTH CONTROL COUNSELING: Primary | ICD-10-CM

## 2024-11-21 DIAGNOSIS — Z59.41 FOOD INSECURITY: ICD-10-CM

## 2024-11-21 LAB — PREGNANCY TEST URINE, POC: NEGATIVE

## 2024-11-21 PROCEDURE — 96372 THER/PROPH/DIAG INJ SC/IM: CPT | Performed by: ADVANCED PRACTICE MIDWIFE

## 2024-11-21 PROCEDURE — 1036F TOBACCO NON-USER: CPT | Performed by: ADVANCED PRACTICE MIDWIFE

## 2024-11-21 PROCEDURE — 3008F BODY MASS INDEX DOCD: CPT | Performed by: ADVANCED PRACTICE MIDWIFE

## 2024-11-21 PROCEDURE — 99395 PREV VISIT EST AGE 18-39: CPT | Performed by: ADVANCED PRACTICE MIDWIFE

## 2024-11-21 PROCEDURE — 81025 URINE PREGNANCY TEST: CPT | Performed by: ADVANCED PRACTICE MIDWIFE

## 2024-11-21 PROCEDURE — 2500000004 HC RX 250 GENERAL PHARMACY W/ HCPCS (ALT 636 FOR OP/ED): Performed by: ADVANCED PRACTICE MIDWIFE

## 2024-11-21 RX ORDER — LEVONORGESTREL/ETHINYL ESTRADIOL 2.6; 2.3 MG/1; MG/1
1 PATCH TRANSDERMAL
Qty: 4 PATCH | Refills: 11 | Status: SHIPPED | OUTPATIENT
Start: 2024-11-21 | End: 2024-12-21

## 2024-11-21 RX ORDER — MEDROXYPROGESTERONE ACETATE 150 MG/ML
150 INJECTION, SUSPENSION INTRAMUSCULAR ONCE
Status: COMPLETED | OUTPATIENT
Start: 2024-11-21 | End: 2024-11-21

## 2024-11-21 SDOH — ECONOMIC STABILITY - FOOD INSECURITY: FOOD INSECURITY: Z59.41

## 2024-11-21 ASSESSMENT — PATIENT HEALTH QUESTIONNAIRE - PHQ9
SUM OF ALL RESPONSES TO PHQ9 QUESTIONS 1 AND 2: 0
2. FEELING DOWN, DEPRESSED OR HOPELESS: NOT AT ALL
1. LITTLE INTEREST OR PLEASURE IN DOING THINGS: NOT AT ALL

## 2024-11-21 ASSESSMENT — ENCOUNTER SYMPTOMS
DEPRESSION: 0
OCCASIONAL FEELINGS OF UNSTEADINESS: 0
LOSS OF SENSATION IN FEET: 0

## 2024-11-21 ASSESSMENT — PAIN SCALES - GENERAL: PAINLEVEL_OUTOF10: 0-NO PAIN

## 2024-11-21 NOTE — PROGRESS NOTES
"Assessment/Plan   Problem List Items Addressed This Visit    None  Visit Diagnoses       Birth control counseling    -  Primary    Relevant Medications    levonorgestreL-ethinyl estrad (Twirla) 120-30 mcg/24 hr patch weekly    Depo-Provera contraceptive status        Relevant Medications    medroxyPROGESTERone (Depo-Provera) 150 mg/mL injection    medroxyPROGESTERone (Depo-Provera) 150 mg/mL injection    medroxyPROGESTERone (Depo-Provera) injection 150 mg (Completed) (Start on 11/21/2024  5:00 PM)    Other Relevant Orders    POCT pregnancy, urine manually resulted (Completed)    Food insecurity        Relevant Orders    Referral to [employer]    Underweight (BMI < 18.5)        Relevant Orders    Referral to [employer]    Well woman exam (no gynecological exam)                 TARAN Huerta-LEONARD Douglas JAY Diaz is a 24 y.o. female who is here for a routine exam. Periods are  very irregular with increased amounts of intramenstrual spotting from DMPA.  Would like to do a dose today and then start another method of birth control.  Reports that she is not as sexually active as she would like to be secondary to the bleeding.  She has no CI to starting the Twirla patch.  We discussed continuous usage of the patch.  She also endorses concerns of being underweight.  She is still lactating which can contribute to the underweight status, but she is also a small framed individual.  Order placed with food insecurity for FFL ,      Ht 1.575 m (5' 2\")   Wt (!) 44.9 kg (99 lb)   BMI 18.11 kg/m²     General:   Alert and oriented x 3   Heart:  Thyroid: Regular rate, rhythm  Euthyroid, normal shape and size   Lungs:  Breast: Clear to auscultation bilaterally  Deferred   Abdomen: Soft, non tender   Vulva: Deferred   Vagina: Deferred   Cervix: Deferred   Uterus: Deferred   Adnexa: Deferred       Thank you for coming to see me for your visit today and most importantly thank you for having a " preventative visit.  If lab work was sent, you will see your test result via [employer]  Any prescriptions will be sent electronically to your pharmacy listed    I look forward to seeing you next year for your health care needs.  Please remember:   Sleep is important - make it a priority for at least 6 hours per night!   Exercise such as walking for 20 minutes per day is beneficial to your physical and mental health   Healthy diets can be expensive -- if you every feel like you are struggling to afford healthy food, please let me know as we have a very good program called [employer] that is available to you   Decrease alcohol and tobacco.  A goal of less than 7 alcoholic drinks per week is recommended for risk reduction of breast and colon cancer.  We will do a pap smear next year    Be well!  Roselyn

## 2024-11-22 ENCOUNTER — APPOINTMENT (OUTPATIENT)
Dept: OBSTETRICS AND GYNECOLOGY | Facility: CLINIC | Age: 24
End: 2024-11-22
Payer: MEDICAID

## 2025-01-07 DIAGNOSIS — Z30.09 BIRTH CONTROL COUNSELING: Primary | ICD-10-CM

## 2025-01-07 RX ORDER — LEVONORGESTREL/ETHINYL ESTRADIOL 2.6; 2.3 MG/1; MG/1
1 PATCH TRANSDERMAL
Qty: 3 PATCH | Refills: 11 | Status: SHIPPED | OUTPATIENT
Start: 2025-01-07 | End: 2025-02-07

## 2025-01-09 PROCEDURE — RXMED WILLOW AMBULATORY MEDICATION CHARGE

## 2025-01-10 ENCOUNTER — PHARMACY VISIT (OUTPATIENT)
Dept: PHARMACY | Facility: CLINIC | Age: 25
End: 2025-01-10
Payer: MEDICAID

## 2025-01-23 ENCOUNTER — APPOINTMENT (OUTPATIENT)
Dept: DERMATOLOGY | Facility: CLINIC | Age: 25
End: 2025-01-23
Payer: MEDICAID

## 2025-01-30 PROCEDURE — RXMED WILLOW AMBULATORY MEDICATION CHARGE

## 2025-02-04 ENCOUNTER — PHARMACY VISIT (OUTPATIENT)
Dept: PHARMACY | Facility: CLINIC | Age: 25
End: 2025-02-04
Payer: MEDICAID

## 2025-02-06 ENCOUNTER — APPOINTMENT (OUTPATIENT)
Dept: OBSTETRICS AND GYNECOLOGY | Facility: CLINIC | Age: 25
End: 2025-02-06
Payer: MEDICAID

## 2025-03-27 ENCOUNTER — APPOINTMENT (OUTPATIENT)
Dept: OBSTETRICS AND GYNECOLOGY | Facility: CLINIC | Age: 25
End: 2025-03-27
Payer: MEDICAID

## 2025-04-18 ENCOUNTER — APPOINTMENT (OUTPATIENT)
Dept: PRIMARY CARE | Facility: CLINIC | Age: 25
End: 2025-04-18
Payer: MEDICAID

## 2025-04-21 ENCOUNTER — OFFICE VISIT (OUTPATIENT)
Dept: OBSTETRICS AND GYNECOLOGY | Facility: HOSPITAL | Age: 25
End: 2025-04-21
Payer: MEDICAID

## 2025-04-21 VITALS
WEIGHT: 95 LBS | HEART RATE: 87 BPM | BODY MASS INDEX: 17.48 KG/M2 | DIASTOLIC BLOOD PRESSURE: 71 MMHG | HEIGHT: 62 IN | SYSTOLIC BLOOD PRESSURE: 105 MMHG

## 2025-04-21 DIAGNOSIS — Z30.42 ENCOUNTER FOR DEPO-PROVERA CONTRACEPTION: Primary | ICD-10-CM

## 2025-04-21 DIAGNOSIS — R39.9 UTI SYMPTOMS: ICD-10-CM

## 2025-04-21 LAB
POC APPEARANCE, URINE: CLEAR
POC BILIRUBIN, URINE: NEGATIVE
POC BLOOD, URINE: ABNORMAL
POC COLOR, URINE: YELLOW
POC GLUCOSE, URINE: NEGATIVE MG/DL
POC KETONES, URINE: ABNORMAL MG/DL
POC LEUKOCYTES, URINE: ABNORMAL
POC NITRITE,URINE: NEGATIVE
POC PH, URINE: 7 PH
POC PROTEIN, URINE: ABNORMAL MG/DL
POC SPECIFIC GRAVITY, URINE: 1.02
POC UROBILINOGEN, URINE: 4 EU/DL
PREGNANCY TEST URINE, POC: NEGATIVE

## 2025-04-21 PROCEDURE — 3008F BODY MASS INDEX DOCD: CPT

## 2025-04-21 PROCEDURE — 99214 OFFICE O/P EST MOD 30 MIN: CPT

## 2025-04-21 PROCEDURE — 96372 THER/PROPH/DIAG INJ SC/IM: CPT

## 2025-04-21 PROCEDURE — 81025 URINE PREGNANCY TEST: CPT

## 2025-04-21 PROCEDURE — 1036F TOBACCO NON-USER: CPT

## 2025-04-21 PROCEDURE — 2500000004 HC RX 250 GENERAL PHARMACY W/ HCPCS (ALT 636 FOR OP/ED): Mod: JZ,SE

## 2025-04-21 PROCEDURE — 81003 URINALYSIS AUTO W/O SCOPE: CPT | Mod: QW

## 2025-04-21 RX ORDER — MEDROXYPROGESTERONE ACETATE 150 MG/ML
150 INJECTION, SUSPENSION INTRAMUSCULAR
Status: SHIPPED | OUTPATIENT
Start: 2025-04-21 | End: 2026-03-23

## 2025-04-21 RX ADMIN — MEDROXYPROGESTERONE ACETATE 150 MG: 150 INJECTION, SUSPENSION, EXTENDED RELEASE INTRAMUSCULAR at 11:53

## 2025-04-21 NOTE — PROGRESS NOTES
"Lu Douglas JAY Diaz is a 24 y.o. female who presents for depo restart.  Patient was on Depo post delivery in 2024, then desired to switch to the transdermal patch in 2024 due to continuous breakthrough bleeding.  The transdermal patches stopped the breakthrough bleeding, but other undesirable symptoms started, which is causing her desire to switch back to Depo.  She states she would prefer the breakthrough over the current side effects on the transdermal patch.    Sexual Activity: not sexually active  Pertinent past medical history: none.  OB History          2    Para   1    Term   1       0    AB   1    Living   1         SAB   1    IAB   0    Ectopic   0    Multiple   0    Live Births   1                  Objective   /71 (BP Location: Left arm, Patient Position: Lying, BP Cuff Size: Adult)   Pulse 87   Ht 1.575 m (5' 2\")   Wt (!) 43.1 kg (95 lb)   LMP  (LMP Unknown)   Breastfeeding Unknown   BMI 17.38 kg/m²   Physical Exam  Constitutional:       Appearance: Normal appearance.   HENT:      Head: Normocephalic and atraumatic.      Mouth/Throat:      Mouth: Mucous membranes are moist.   Pulmonary:      Effort: Pulmonary effort is normal.   Musculoskeletal:         General: Normal range of motion.      Cervical back: Normal range of motion.   Neurological:      Mental Status: She is alert and oriented to person, place, and time.   Skin:     General: Skin is warm and dry.   Psychiatric:         Mood and Affect: Mood normal.         Behavior: Behavior normal.         Thought Content: Thought content normal.         Judgment: Judgment normal.           Assessment/Plan   Problem List Items Addressed This Visit    None  Visit Diagnoses         Codes      Encounter for Depo-Provera contraception    -  Primary Z30.42    Relevant Medications    medroxyPROGESTERone (Depo-Provera) injection 150 mg    Other Relevant Orders    POCT pregnancy, urine manually resulted " (Completed)      UTI symptoms     R39.9    Relevant Orders    POCT UA Automated manually resulted (Completed)    Urine Culture          UPT negative.  Importance of condoms for STI prevention reviewed. Advised condoms for contraception for the first 7 days after Depo.   Common discomforts and bleeding profile reviewed with patient, as well as possible delayed return of fertility after Depo discontinuation.   Warning s/s reviewed.  Return in 12 weeks for RN visit for next injection and annual exam with PAP.  Glenny Tenorio, TARAN-LEONARD

## 2025-04-24 LAB — BACTERIA UR CULT: ABNORMAL

## 2025-04-25 DIAGNOSIS — N30.01 ACUTE CYSTITIS WITH HEMATURIA: Primary | ICD-10-CM

## 2025-04-25 RX ORDER — NITROFURANTOIN 25; 75 MG/1; MG/1
100 CAPSULE ORAL 2 TIMES DAILY
Qty: 10 CAPSULE | Refills: 0 | Status: SHIPPED | OUTPATIENT
Start: 2025-04-25 | End: 2025-04-30

## 2025-07-09 NOTE — PROGRESS NOTES
Assessment/Plan   Problem List Items Addressed This Visit           ICD-10-CM    Well woman exam - Primary Z01.419     Other Visit Diagnoses         Codes      Screening for cervical cancer     Z12.4    Relevant Orders    THINPREP PAP TEST (25-30)      Depo-Provera contraceptive status     Z30.42            Lynn Juarez, TARAN-CNM     [facility]   [place] JAY Diaz is a 25 y.o. female who is here for a routine exam. On Depo currently. Patient endorses occasional bleeding when stressed, lasting for a few days then subsides Denies excessive weight gain, weight loss, or significant change in diet or exercise. Patient denies urinary symptoms including frequency, urgency, burning, or dysuria. Denies constipation, diarrhea, or abdominal pain.     Last pap 2022 - pap due today    Sexual hx: Not currently, on depo       ROS  Negative      There were no vitals taken for this visit.    General:   Alert and oriented x 3   Heart:  Thyroid: Regular rate, rhythm  Euthyroid, normal shape and size   Lungs:  Breast: Clear to auscultation bilaterally  Symmetrical, no skin changes/nipple discharge, redness, tenderness, no masses palpated bilaterally   Abdomen: Soft, non tender   Vulva: EGBUS normal   Vagina: Pink, normal discharge   Cervix: No CMT   Uterus: Normal shape, size   Adnexa: NT bilaterally     PLAN:  -Return for annual exam in 2026 or sooner if needed  -Next pap will be due in 2028      Thank you for coming to see me for your visit today and most importantly thank you for having a preventative visit.  If lab work was sent, you will see your test result via [employer]  Any prescriptions will be sent electronically to your pharmacy listed    I look forward to seeing you next year for your health care needs.  Please remember:   Sleep is important - make it a priority for at least 6 hours per night!   Exercise such as walking for 20 minutes per day is beneficial to your physical and mental health   Healthy diets can be  expensive -- if you every feel like you are struggling to afford healthy food, please let me know as we have a very good program called [employer] that is available to you   Decrease alcohol and tobacco.  A goal of less than 7 alcoholic drinks per week is recommended for risk reduction of breast and colon cancer by 38%!    Be well!  Roselyn

## 2025-07-10 ENCOUNTER — OFFICE VISIT (OUTPATIENT)
Facility: CLINIC | Age: 25
End: 2025-07-10
Payer: MEDICAID

## 2025-07-10 VITALS
WEIGHT: 99.5 LBS | SYSTOLIC BLOOD PRESSURE: 98 MMHG | BODY MASS INDEX: 18.31 KG/M2 | HEIGHT: 62 IN | DIASTOLIC BLOOD PRESSURE: 60 MMHG

## 2025-07-10 DIAGNOSIS — Z01.419 WELL WOMAN EXAM: Primary | ICD-10-CM

## 2025-07-10 DIAGNOSIS — Z30.42 DEPO-PROVERA CONTRACEPTIVE STATUS: ICD-10-CM

## 2025-07-10 DIAGNOSIS — Z12.4 SCREENING FOR CERVICAL CANCER: ICD-10-CM

## 2025-07-10 PROCEDURE — 99395 PREV VISIT EST AGE 18-39: CPT | Performed by: ADVANCED PRACTICE MIDWIFE

## 2025-07-10 PROCEDURE — 3008F BODY MASS INDEX DOCD: CPT | Performed by: ADVANCED PRACTICE MIDWIFE

## 2025-07-10 PROCEDURE — 96372 THER/PROPH/DIAG INJ SC/IM: CPT | Performed by: ADVANCED PRACTICE MIDWIFE

## 2025-07-10 PROCEDURE — 1036F TOBACCO NON-USER: CPT | Performed by: ADVANCED PRACTICE MIDWIFE

## 2025-07-10 PROCEDURE — 2500000004 HC RX 250 GENERAL PHARMACY W/ HCPCS (ALT 636 FOR OP/ED): Performed by: ADVANCED PRACTICE MIDWIFE

## 2025-07-10 RX ORDER — MEDROXYPROGESTERONE ACETATE 150 MG/ML
150 INJECTION, SUSPENSION INTRAMUSCULAR ONCE
Status: COMPLETED | OUTPATIENT
Start: 2025-07-10 | End: 2025-07-10

## 2025-07-10 RX ADMIN — MEDROXYPROGESTERONE ACETATE 150 MG: 150 INJECTION, SUSPENSION INTRAMUSCULAR at 10:54

## 2025-07-10 ASSESSMENT — ENCOUNTER SYMPTOMS
OCCASIONAL FEELINGS OF UNSTEADINESS: 0
LOSS OF SENSATION IN FEET: 0
DEPRESSION: 0

## 2025-07-10 ASSESSMENT — PAIN SCALES - GENERAL: PAINLEVEL_OUTOF10: 0-NO PAIN

## 2025-07-31 LAB
CYTOLOGY CMNT CVX/VAG CYTO-IMP: NORMAL
LAB AP HPV GENOTYPE QUESTION: NO
LAB AP HPV HR: NORMAL
LABORATORY COMMENT REPORT: NORMAL
PATH REPORT.TOTAL CANCER: NORMAL